# Patient Record
Sex: MALE | Race: WHITE | Employment: OTHER | URBAN - METROPOLITAN AREA
[De-identification: names, ages, dates, MRNs, and addresses within clinical notes are randomized per-mention and may not be internally consistent; named-entity substitution may affect disease eponyms.]

---

## 2017-03-07 PROCEDURE — 36415 COLL VENOUS BLD VENIPUNCTURE: CPT | Performed by: INTERNAL MEDICINE

## 2017-03-07 PROCEDURE — 80061 LIPID PANEL: CPT | Performed by: INTERNAL MEDICINE

## 2017-03-07 PROCEDURE — 80053 COMPREHEN METABOLIC PANEL: CPT | Performed by: INTERNAL MEDICINE

## 2017-03-07 PROCEDURE — 83036 HEMOGLOBIN GLYCOSYLATED A1C: CPT | Performed by: INTERNAL MEDICINE

## 2017-03-14 PROBLEM — Z79.899 ENCOUNTER FOR LONG-TERM (CURRENT) USE OF MEDICATIONS: Status: ACTIVE | Noted: 2017-03-14

## 2017-03-14 PROBLEM — Z12.5 SPECIAL SCREENING FOR MALIGNANT NEOPLASM OF PROSTATE: Status: ACTIVE | Noted: 2017-03-14

## 2017-09-12 PROCEDURE — 84153 ASSAY OF PSA TOTAL: CPT | Performed by: INTERNAL MEDICINE

## 2017-09-19 PROBLEM — Z12.5 PROSTATE CANCER SCREENING: Status: ACTIVE | Noted: 2017-09-19

## 2017-09-19 PROBLEM — Z96.652 STATUS POST TOTAL KNEE REPLACEMENT, LEFT: Status: ACTIVE | Noted: 2017-09-19

## 2017-09-19 PROBLEM — Z96.651 STATUS POST TOTAL KNEE REPLACEMENT, RIGHT: Status: ACTIVE | Noted: 2017-09-19

## 2017-09-19 PROBLEM — Z79.899 ENCOUNTER FOR LONG-TERM (CURRENT) USE OF MEDICATIONS: Status: ACTIVE | Noted: 2017-09-19

## 2017-09-19 PROBLEM — Z96.642 STATUS POST TOTAL HIP REPLACEMENT, LEFT: Status: ACTIVE | Noted: 2017-09-19

## 2017-12-05 PROBLEM — M76.51 PATELLAR TENDONITIS OF RIGHT KNEE: Status: ACTIVE | Noted: 2017-12-05

## 2018-01-14 ENCOUNTER — APPOINTMENT (OUTPATIENT)
Dept: ULTRASOUND IMAGING | Facility: HOSPITAL | Age: 78
DRG: 868 | End: 2018-01-14
Attending: EMERGENCY MEDICINE
Payer: MEDICARE

## 2018-01-14 ENCOUNTER — HOSPITAL ENCOUNTER (INPATIENT)
Facility: HOSPITAL | Age: 78
LOS: 3 days | Discharge: HOME HEALTH | DRG: 868 | End: 2018-01-18
Attending: EMERGENCY MEDICINE | Admitting: HOSPITALIST
Payer: MEDICARE

## 2018-01-14 ENCOUNTER — APPOINTMENT (OUTPATIENT)
Dept: GENERAL RADIOLOGY | Facility: HOSPITAL | Age: 78
DRG: 868 | End: 2018-01-14
Attending: EMERGENCY MEDICINE
Payer: MEDICARE

## 2018-01-14 DIAGNOSIS — R22.33: ICD-10-CM

## 2018-01-14 DIAGNOSIS — R50.9 FEVER, UNSPECIFIED FEVER CAUSE: Primary | ICD-10-CM

## 2018-01-14 DIAGNOSIS — M79.89 LOCALIZED SWELLING OF BOTH LOWER EXTREMITIES: ICD-10-CM

## 2018-01-14 LAB
ALBUMIN SERPL-MCNC: 2.8 G/DL (ref 3.5–4.8)
ALP LIVER SERPL-CCNC: 56 U/L (ref 45–117)
ALT SERPL-CCNC: 28 U/L (ref 17–63)
AST SERPL-CCNC: 27 U/L (ref 15–41)
BASOPHILS # BLD AUTO: 0.02 X10(3) UL (ref 0–0.1)
BASOPHILS NFR BLD AUTO: 0.3 %
BILIRUB SERPL-MCNC: 0.7 MG/DL (ref 0.1–2)
BILIRUB UR QL STRIP.AUTO: NEGATIVE
BUN BLD-MCNC: 17 MG/DL (ref 8–20)
C-REACTIVE PROTEIN: 13.9 MG/DL (ref ?–1)
CALCIUM BLD-MCNC: 8.8 MG/DL (ref 8.3–10.3)
CHLORIDE: 103 MMOL/L (ref 101–111)
CO2: 23 MMOL/L (ref 22–32)
COLOR UR AUTO: YELLOW
CREAT BLD-MCNC: 0.86 MG/DL (ref 0.7–1.3)
EOSINOPHIL # BLD AUTO: 0.06 X10(3) UL (ref 0–0.3)
EOSINOPHIL NFR BLD AUTO: 0.9 %
ERYTHROCYTE [DISTWIDTH] IN BLOOD BY AUTOMATED COUNT: 12.7 % (ref 11.5–16)
GLUCOSE BLD-MCNC: 111 MG/DL (ref 70–99)
GLUCOSE UR STRIP.AUTO-MCNC: NEGATIVE MG/DL
HCT VFR BLD AUTO: 34.2 % (ref 37–53)
HGB BLD-MCNC: 11.4 G/DL (ref 13–17)
IMMATURE GRANULOCYTE COUNT: 0.02 X10(3) UL (ref 0–1)
IMMATURE GRANULOCYTE RATIO %: 0.3 %
LACTIC ACID: 1 MMOL/L (ref 0.5–2)
LEUKOCYTE ESTERASE UR QL STRIP.AUTO: NEGATIVE
LYMPHOCYTES # BLD AUTO: 0.63 X10(3) UL (ref 0.9–4)
LYMPHOCYTES NFR BLD AUTO: 9.5 %
M PROTEIN MFR SERPL ELPH: 7.2 G/DL (ref 6.1–8.3)
MCH RBC QN AUTO: 29.2 PG (ref 27–33.2)
MCHC RBC AUTO-ENTMCNC: 33.3 G/DL (ref 31–37)
MCV RBC AUTO: 87.5 FL (ref 80–99)
MONOCYTES # BLD AUTO: 0.7 X10(3) UL (ref 0.1–0.6)
MONOCYTES NFR BLD AUTO: 10.5 %
NEUTROPHIL ABS PRELIM: 5.21 X10 (3) UL (ref 1.3–6.7)
NEUTROPHILS # BLD AUTO: 5.21 X10(3) UL (ref 1.3–6.7)
NEUTROPHILS NFR BLD AUTO: 78.5 %
NITRITE UR QL STRIP.AUTO: NEGATIVE
PH UR STRIP.AUTO: 5 [PH] (ref 4.5–8)
PLATELET # BLD AUTO: 298 10(3)UL (ref 150–450)
POTASSIUM SERPL-SCNC: 3.9 MMOL/L (ref 3.6–5.1)
PROT UR STRIP.AUTO-MCNC: NEGATIVE MG/DL
RBC # BLD AUTO: 3.91 X10(6)UL (ref 3.8–5.8)
RED CELL DISTRIBUTION WIDTH-SD: 40.3 FL (ref 35.1–46.3)
SED RATE-ML: 85 MM/HR (ref 0–12)
SODIUM SERPL-SCNC: 135 MMOL/L (ref 136–144)
SP GR UR STRIP.AUTO: 1.02 (ref 1–1.03)
UROBILINOGEN UR STRIP.AUTO-MCNC: <2 MG/DL
WBC # BLD AUTO: 6.6 X10(3) UL (ref 4–13)

## 2018-01-14 PROCEDURE — 87040 BLOOD CULTURE FOR BACTERIA: CPT | Performed by: EMERGENCY MEDICINE

## 2018-01-14 PROCEDURE — 71046 X-RAY EXAM CHEST 2 VIEWS: CPT | Performed by: EMERGENCY MEDICINE

## 2018-01-14 PROCEDURE — 96366 THER/PROPH/DIAG IV INF ADDON: CPT

## 2018-01-14 PROCEDURE — 80053 COMPREHEN METABOLIC PANEL: CPT | Performed by: EMERGENCY MEDICINE

## 2018-01-14 PROCEDURE — 93010 ELECTROCARDIOGRAM REPORT: CPT

## 2018-01-14 PROCEDURE — 85025 COMPLETE CBC W/AUTO DIFF WBC: CPT | Performed by: EMERGENCY MEDICINE

## 2018-01-14 PROCEDURE — 99285 EMERGENCY DEPT VISIT HI MDM: CPT

## 2018-01-14 PROCEDURE — 86140 C-REACTIVE PROTEIN: CPT | Performed by: EMERGENCY MEDICINE

## 2018-01-14 PROCEDURE — 96367 TX/PROPH/DG ADDL SEQ IV INF: CPT

## 2018-01-14 PROCEDURE — 93005 ELECTROCARDIOGRAM TRACING: CPT

## 2018-01-14 PROCEDURE — 93971 EXTREMITY STUDY: CPT | Performed by: EMERGENCY MEDICINE

## 2018-01-14 PROCEDURE — 36415 COLL VENOUS BLD VENIPUNCTURE: CPT

## 2018-01-14 PROCEDURE — 85652 RBC SED RATE AUTOMATED: CPT | Performed by: EMERGENCY MEDICINE

## 2018-01-14 PROCEDURE — 83605 ASSAY OF LACTIC ACID: CPT | Performed by: EMERGENCY MEDICINE

## 2018-01-14 PROCEDURE — 81001 URINALYSIS AUTO W/SCOPE: CPT | Performed by: EMERGENCY MEDICINE

## 2018-01-14 PROCEDURE — 96365 THER/PROPH/DIAG IV INF INIT: CPT

## 2018-01-14 RX ORDER — SODIUM CHLORIDE 9 MG/ML
INJECTION, SOLUTION INTRAVENOUS CONTINUOUS
Status: ACTIVE | OUTPATIENT
Start: 2018-01-14 | End: 2018-01-15

## 2018-01-14 RX ORDER — SODIUM CHLORIDE 9 MG/ML
125 INJECTION, SOLUTION INTRAVENOUS CONTINUOUS
Status: DISCONTINUED | OUTPATIENT
Start: 2018-01-14 | End: 2018-01-18

## 2018-01-15 ENCOUNTER — APPOINTMENT (OUTPATIENT)
Dept: CV DIAGNOSTICS | Facility: HOSPITAL | Age: 78
DRG: 868 | End: 2018-01-15
Attending: HOSPITALIST
Payer: MEDICARE

## 2018-01-15 ENCOUNTER — APPOINTMENT (OUTPATIENT)
Dept: CT IMAGING | Facility: HOSPITAL | Age: 78
DRG: 868 | End: 2018-01-15
Attending: HOSPITALIST
Payer: MEDICARE

## 2018-01-15 ENCOUNTER — APPOINTMENT (OUTPATIENT)
Dept: ULTRASOUND IMAGING | Facility: HOSPITAL | Age: 78
DRG: 868 | End: 2018-01-15
Attending: HOSPITALIST
Payer: MEDICARE

## 2018-01-15 PROBLEM — R22.33: Status: ACTIVE | Noted: 2018-01-15

## 2018-01-15 PROBLEM — R50.9 FEVER, UNSPECIFIED FEVER CAUSE: Status: ACTIVE | Noted: 2018-01-15

## 2018-01-15 PROBLEM — M79.89 LOCALIZED SWELLING OF BOTH LOWER EXTREMITIES: Status: ACTIVE | Noted: 2018-01-15

## 2018-01-15 PROBLEM — M99.86: Status: ACTIVE | Noted: 2018-01-15

## 2018-01-15 LAB
ALBUMIN SERPL-MCNC: 2.5 G/DL (ref 3.5–4.8)
ALP LIVER SERPL-CCNC: 53 U/L (ref 45–117)
ALT SERPL-CCNC: 28 U/L (ref 17–63)
AST SERPL-CCNC: 35 U/L (ref 15–41)
ATRIAL RATE: 91 BPM
BASOPHILS # BLD AUTO: 0.02 X10(3) UL (ref 0–0.1)
BASOPHILS NFR BLD AUTO: 0.4 %
BETA NATRIURETIC PEPTIDE: 52 PG/ML (ref 2–99)
BILIRUB SERPL-MCNC: 0.7 MG/DL (ref 0.1–2)
BUN BLD-MCNC: 14 MG/DL (ref 8–20)
CALCIUM BLD-MCNC: 8.5 MG/DL (ref 8.3–10.3)
CHLORIDE: 106 MMOL/L (ref 101–111)
CO2: 23 MMOL/L (ref 22–32)
CREAT BLD-MCNC: 0.75 MG/DL (ref 0.7–1.3)
EOSINOPHIL # BLD AUTO: 0.14 X10(3) UL (ref 0–0.3)
EOSINOPHIL NFR BLD AUTO: 2.8 %
ERYTHROCYTE [DISTWIDTH] IN BLOOD BY AUTOMATED COUNT: 12.5 % (ref 11.5–16)
GLUCOSE BLD-MCNC: 115 MG/DL (ref 70–99)
HAV IGM SER QL: 2.1 MG/DL (ref 1.7–3)
HCT VFR BLD AUTO: 32 % (ref 37–53)
HGB BLD-MCNC: 10.6 G/DL (ref 13–17)
IMMATURE GRANULOCYTE COUNT: 0.01 X10(3) UL (ref 0–1)
IMMATURE GRANULOCYTE RATIO %: 0.2 %
INR BLD: 1.22 (ref 0.89–1.11)
LYMPHOCYTES # BLD AUTO: 0.51 X10(3) UL (ref 0.9–4)
LYMPHOCYTES NFR BLD AUTO: 10.2 %
M PROTEIN MFR SERPL ELPH: 6.7 G/DL (ref 6.1–8.3)
MCH RBC QN AUTO: 29 PG (ref 27–33.2)
MCHC RBC AUTO-ENTMCNC: 33.1 G/DL (ref 31–37)
MCV RBC AUTO: 87.4 FL (ref 80–99)
MONOCYTES # BLD AUTO: 0.5 X10(3) UL (ref 0.1–0.6)
MONOCYTES NFR BLD AUTO: 10 %
NEUTROPHIL ABS PRELIM: 3.8 X10 (3) UL (ref 1.3–6.7)
NEUTROPHILS # BLD AUTO: 3.8 X10(3) UL (ref 1.3–6.7)
NEUTROPHILS NFR BLD AUTO: 76.4 %
P AXIS: 13 DEGREES
P-R INTERVAL: 154 MS
PLATELET # BLD AUTO: 255 10(3)UL (ref 150–450)
POTASSIUM SERPL-SCNC: 3.5 MMOL/L (ref 3.6–5.1)
POTASSIUM SERPL-SCNC: 4.3 MMOL/L (ref 3.6–5.1)
PROCALCITONIN SERPL-MCNC: <0.11 NG/ML (ref ?–0.11)
PSA SERPL DL<=0.01 NG/ML-MCNC: 15.5 SECONDS (ref 12–14.3)
Q-T INTERVAL: 382 MS
QRS DURATION: 92 MS
QTC CALCULATION (BEZET): 469 MS
R AXIS: -29 DEGREES
RBC # BLD AUTO: 3.66 X10(6)UL (ref 3.8–5.8)
RED CELL DISTRIBUTION WIDTH-SD: 40 FL (ref 35.1–46.3)
SED RATE-ML: 87 MM/HR (ref 0–12)
SODIUM SERPL-SCNC: 138 MMOL/L (ref 136–144)
T AXIS: 32 DEGREES
TSI SER-ACNC: 1.62 MIU/ML (ref 0.35–5.5)
VENTRICULAR RATE: 91 BPM
WBC # BLD AUTO: 5 X10(3) UL (ref 4–13)

## 2018-01-15 PROCEDURE — 82728 ASSAY OF FERRITIN: CPT | Performed by: HOSPITALIST

## 2018-01-15 PROCEDURE — 74177 CT ABD & PELVIS W/CONTRAST: CPT | Performed by: HOSPITALIST

## 2018-01-15 PROCEDURE — 93306 TTE W/DOPPLER COMPLETE: CPT | Performed by: HOSPITALIST

## 2018-01-15 PROCEDURE — 80053 COMPREHEN METABOLIC PANEL: CPT | Performed by: HOSPITALIST

## 2018-01-15 PROCEDURE — 85610 PROTHROMBIN TIME: CPT | Performed by: HOSPITALIST

## 2018-01-15 PROCEDURE — 85025 COMPLETE CBC W/AUTO DIFF WBC: CPT | Performed by: HOSPITALIST

## 2018-01-15 PROCEDURE — 85652 RBC SED RATE AUTOMATED: CPT | Performed by: HOSPITALIST

## 2018-01-15 PROCEDURE — 84132 ASSAY OF SERUM POTASSIUM: CPT | Performed by: HOSPITALIST

## 2018-01-15 PROCEDURE — 83880 ASSAY OF NATRIURETIC PEPTIDE: CPT | Performed by: HOSPITALIST

## 2018-01-15 PROCEDURE — 84145 PROCALCITONIN (PCT): CPT | Performed by: HOSPITALIST

## 2018-01-15 PROCEDURE — 83516 IMMUNOASSAY NONANTIBODY: CPT | Performed by: HOSPITALIST

## 2018-01-15 PROCEDURE — 83735 ASSAY OF MAGNESIUM: CPT | Performed by: HOSPITALIST

## 2018-01-15 PROCEDURE — 84443 ASSAY THYROID STIM HORMONE: CPT | Performed by: HOSPITALIST

## 2018-01-15 PROCEDURE — 83550 IRON BINDING TEST: CPT | Performed by: HOSPITALIST

## 2018-01-15 PROCEDURE — 86038 ANTINUCLEAR ANTIBODIES: CPT | Performed by: HOSPITALIST

## 2018-01-15 PROCEDURE — 83540 ASSAY OF IRON: CPT | Performed by: HOSPITALIST

## 2018-01-15 PROCEDURE — 86255 FLUORESCENT ANTIBODY SCREEN: CPT | Performed by: HOSPITALIST

## 2018-01-15 PROCEDURE — 93971 EXTREMITY STUDY: CPT | Performed by: HOSPITALIST

## 2018-01-15 PROCEDURE — 83876 ASSAY MYELOPEROXIDASE: CPT | Performed by: HOSPITALIST

## 2018-01-15 PROCEDURE — 71260 CT THORAX DX C+: CPT | Performed by: HOSPITALIST

## 2018-01-15 RX ORDER — ONDANSETRON 2 MG/ML
4 INJECTION INTRAMUSCULAR; INTRAVENOUS EVERY 6 HOURS PRN
Status: DISCONTINUED | OUTPATIENT
Start: 2018-01-15 | End: 2018-01-18

## 2018-01-15 RX ORDER — HEPARIN SODIUM 5000 [USP'U]/ML
7500 INJECTION, SOLUTION INTRAVENOUS; SUBCUTANEOUS EVERY 8 HOURS SCHEDULED
Status: DISCONTINUED | OUTPATIENT
Start: 2018-01-15 | End: 2018-01-18

## 2018-01-15 RX ORDER — PANTOPRAZOLE SODIUM 20 MG/1
20 TABLET, DELAYED RELEASE ORAL
Status: DISCONTINUED | OUTPATIENT
Start: 2018-01-15 | End: 2018-01-18

## 2018-01-15 RX ORDER — ACETAMINOPHEN 325 MG/1
650 TABLET ORAL EVERY 6 HOURS PRN
Status: DISCONTINUED | OUTPATIENT
Start: 2018-01-15 | End: 2018-01-18

## 2018-01-15 RX ORDER — SODIUM CHLORIDE 9 MG/ML
INJECTION, SOLUTION INTRAVENOUS CONTINUOUS
Status: DISCONTINUED | OUTPATIENT
Start: 2018-01-15 | End: 2018-01-18

## 2018-01-15 RX ORDER — POTASSIUM CHLORIDE 20 MEQ/1
40 TABLET, EXTENDED RELEASE ORAL EVERY 4 HOURS
Status: DISPENSED | OUTPATIENT
Start: 2018-01-15 | End: 2018-01-15

## 2018-01-15 NOTE — PROGRESS NOTES
[facility] Pharmacy Progress Note:  Anticoagulation Weight Dose Adjustment for heparin    Valentin Beaulieu is a 65year old male who has been prescribed heparin for VTE prophylaxis. Estimated Creatinine Clearance: 59.3 mL/min (based on SCr of 0.86 mg/dL).

## 2018-01-15 NOTE — ED INITIAL ASSESSMENT (HPI)
Complaint of BLE weakness, fevers, and \"blisters\" to the hands that he noticed today. Denies any recent falls or trauma. Denies cough. Patient also said he has had diarrhea x 3 since yesterday.

## 2018-01-15 NOTE — PROGRESS NOTES
[facility] Pharmacy Note: Antimicrobial Weight Dose Adjustment for: vancomycin Marcela Muhammad    Eriberto Marsh is a 65year old male who has been prescribed vancomycin (VANCOCIN) 1000 mg IV x 1 in ED.   CrCl is CrCl cannot be calculated (Patient's most recent lab re

## 2018-01-15 NOTE — ED PROVIDER NOTES
Patient Seen in: [facility] Emergency Department    History   Patient presents with:  Numbness Weakness (neurologic)  Fever (infectious)    Stated Complaint: ble weakness, fever, blisters    HPI    Patient presents to the emergency department with p pleasant obese elderly man lying on an emergency department bed with his daughter at bedside. He is awake alert and oriented ×4 he is in no acute distress nontoxic his HEENT exam is within normal limits his neck is supple.   His heart has very distant opal components within normal limits   LACTIC ACID, PLASMA - Normal   CBC WITH DIFFERENTIAL WITH PLATELET    Narrative: The following orders were created for panel order CBC WITH DIFFERENTIAL WITH PLATELET.   Procedure                               Abnormali feet I do think he has to be admitted.   I discussed the case with Dr. Chacha Aguilar and we will get an echocardiogram.  We will consult infectious disease as per his request and patient has been started on broad-spectrum antibiotics given the fever however his whit

## 2018-01-15 NOTE — CONSULTS
INFECTIOUS DISEASE CONSULTATION    Angela Santiago Patient Status:  Observation    1940 MRN [id number]   [facility] 4NW-A Attending [facility] Sink   Hosp Day # 0 PCP Otf Vidal Exam:    General: No acute distress. Alert and oriented x 3. Vital signs: Temp:  [97.8 °F (36.6 °C)-99.6 °F (37.6 °C)] 99.6 °F (37.6 °C)  Pulse:  [78-99] 82  Resp:  [15-22] 18  BP: (112-150)/(46-79) 127/60  HEENT: Moist mucous membranes.  Extraocular muscl Daysi     MEDICARE WELLNESS VISIT - 9/19/17*     Actinic keratosis (9/11) [facility] D[facility]     Varicose veins of both lower extremities with pain     Diabetic eye exam - ADAN Santillan [facility])     Trigger finger of right thumb - MADY Green

## 2018-01-15 NOTE — PROGRESS NOTES
[facility] Pharmacy Note: Antimicrobial Weight Dose Adjustment for: piperacillin/tazobactam (Jus Macadamia)    Florida Godwin is a 65year old male who has been prescribed piperacillin/tazobactam (ZOSYN) 3.375 gm IV x 1 in ED.   CrCl is CrCl cannot be calculated (Patient

## 2018-01-16 LAB
ANA SCREEN: NEGATIVE
BASOPHILS # BLD AUTO: 0.01 X10(3) UL (ref 0–0.1)
BASOPHILS NFR BLD AUTO: 0.2 %
DEPRECATED HBV CORE AB SER IA-ACNC: 533.6 NG/ML (ref 22–322)
EOSINOPHIL # BLD AUTO: 0.14 X10(3) UL (ref 0–0.3)
EOSINOPHIL NFR BLD AUTO: 3.4 %
ERYTHROCYTE [DISTWIDTH] IN BLOOD BY AUTOMATED COUNT: 12.5 % (ref 11.5–16)
HCT VFR BLD AUTO: 30.3 % (ref 37–53)
HGB BLD-MCNC: 10 G/DL (ref 13–17)
IMMATURE GRANULOCYTE COUNT: 0.01 X10(3) UL (ref 0–1)
IMMATURE GRANULOCYTE RATIO %: 0.2 %
IRON SATURATION: 15 % (ref 13–45)
IRON: 34 UG/DL (ref 45–182)
LYMPHOCYTES # BLD AUTO: 0.58 X10(3) UL (ref 0.9–4)
LYMPHOCYTES NFR BLD AUTO: 14.1 %
MCH RBC QN AUTO: 29.4 PG (ref 27–33.2)
MCHC RBC AUTO-ENTMCNC: 33 G/DL (ref 31–37)
MCV RBC AUTO: 89.1 FL (ref 80–99)
MONOCYTES # BLD AUTO: 0.47 X10(3) UL (ref 0.1–0.6)
MONOCYTES NFR BLD AUTO: 11.4 %
NEUTROPHIL ABS PRELIM: 2.9 X10 (3) UL (ref 1.3–6.7)
NEUTROPHILS # BLD AUTO: 2.9 X10(3) UL (ref 1.3–6.7)
NEUTROPHILS NFR BLD AUTO: 70.7 %
PLATELET # BLD AUTO: 232 10(3)UL (ref 150–450)
RBC # BLD AUTO: 3.4 X10(6)UL (ref 3.8–5.8)
RED CELL DISTRIBUTION WIDTH-SD: 40.6 FL (ref 35.1–46.3)
TOTAL IRON BINDING CAPACITY: 225 UG/DL (ref 298–536)
TRANSFERRIN: 151 MG/DL (ref 200–360)
WBC # BLD AUTO: 4.1 X10(3) UL (ref 4–13)

## 2018-01-16 PROCEDURE — 85025 COMPLETE CBC W/AUTO DIFF WBC: CPT | Performed by: HOSPITALIST

## 2018-01-16 RX ORDER — SODIUM CHLORIDE 9 MG/ML
INJECTION, SOLUTION INTRAVENOUS CONTINUOUS
Status: CANCELLED | OUTPATIENT
Start: 2018-01-16

## 2018-01-16 RX ORDER — SODIUM CHLORIDE 9 MG/ML
INJECTION, SOLUTION INTRAVENOUS CONTINUOUS
Status: DISCONTINUED | OUTPATIENT
Start: 2018-01-16 | End: 2018-01-18

## 2018-01-16 NOTE — CONSULTS
[facility] [address] [place] [address] Cardiology  Report of Consultation    Sheela Sanderson Patient Status:  Inpatient    1940 MRN [id number]   [facility] 4NW-A Attending Precilla Kiran,*   Hosp Day # 1 PCP Jim Harry MD     Reason for Con sodium chloride 42 mL/hr at 01/15/18 0053   • sodium chloride 125 mL/hr (01/14/18 1948)       PRN Medications:   acetaminophen, ondansetron HCl    Outpatient Medications:     No current facility-administered medications on file prior to encounter.    Sheryle Margo No murmurs, rubs, or gallops. PMI is non-displaced with a normal apical impulse. Lungs: Clear to ascultation bilaterally. No focal rales, rhonchi, or wheezes. Good air movement is noted throughout all lung fields. Abdomen: Soft. Morbidly obese.   Non- quality     was poor. The study was technically limited due to poor acoustic window     availability and body habitus. Intravenous contrast (Definity) was     administered to opacify the LV. 2. Left ventricle:  The cavity size was normal. Wall thickness wa

## 2018-01-16 NOTE — PLAN OF CARE
Impaired Functional Mobility    • Achieve highest/safest level of mobility/gait Progressing        Patient/Family Goals    • Patient/Family Long Term Goal Progressing    • Patient/Family Short Term Goal Progressing        SKIN/TISSUE INTEGRITY - ADULT    •

## 2018-01-16 NOTE — PROGRESS NOTES
[facility]                INFECTIOUS DISEASE PROGRESS NOTE    Wendy Hernandez Patient Status:  Inpatient    1940 MRN [id number]   [facility] 4NW-A Attending Luis Miguel Diaz,*   Hosp Day # 1 PCP Casey Carney MD     A Collected: 01/14/18 1944   Order Status: Completed Lab Status: Preliminary result Updated: 01/15/18 2000   Specimen: Blood from Blood,peripheral     Blood Culture Result No Growth 1 Day         Radiology    CT chest    =====  CONCLUSION:  Mild lymphadenopa lesions  Diarrhea prior to admission resolved    Patient reports pet dog, licks his fingers and hands frequently, no bites   No other pets, does not have fish tank    Continue empiric abx/await follow up response and cultures      Jad Frias MD  Me

## 2018-01-16 NOTE — PROGRESS NOTES
[facility] Hospitalist Progress Note                                                                   Everett Murphy  9/25/1940    CC: Fever, rash    Interval History:  - Has some new lesions on 12.5   NEPRELIM  5.21  3.80  2.90   WBC  6.6  5.0  4.1   PLT  298.0  255.0  232.0     Recent Labs   Lab  01/14/18   1929  01/15/18   0718  01/15/18   1921   GLU  111*  115*   --    BUN  17  14   --    CREATSERUM  0.86  0.75   --    CA  8.8  8.5   --    NA repeat UA, if persistently positive needs  eval     LE edema  - Mild, BNP OK, ECHO OK, dopplers neg for clot    FEN:  - General diet  - Lytes prn     Proph:  - DVT proph with      Dispo:  - [facility] hospitalist service  - Consults include: ID, Cards     Outpa

## 2018-01-16 NOTE — CM/SW NOTE
01/16/18 1100   CM/SW Referral Data   Referral Source Social Work (self-referral)   Reason for Referral Discharge planning   Informant Patient   Patient Info   Patient's Mental Status Alert;Oriented   Patient's [address]   Number of Levels

## 2018-01-16 NOTE — PROGRESS NOTES
Patient is awake and alertx4. Room air. IV antibiotics for suspected cellulitis/redness of legs/hands. Patient needs physical therapy eval because he would like it at home. All questions answered and needs addressed.

## 2018-01-16 NOTE — PLAN OF CARE
Patient tolerated ct of abdomen, and was seen by infectious disease, and new anitbitics ordered. No changes to left index finger noted, denies need for pain med. Ambulated up to bathroom with assistance of 1 and patient very slow and apprehnesive upon ambula

## 2018-01-17 ENCOUNTER — APPOINTMENT (OUTPATIENT)
Dept: CV DIAGNOSTICS | Facility: HOSPITAL | Age: 78
DRG: 868 | End: 2018-01-17
Attending: NURSE PRACTITIONER
Payer: MEDICARE

## 2018-01-17 ENCOUNTER — ANESTHESIA (OUTPATIENT)
Dept: PERIOP | Facility: HOSPITAL | Age: 78
DRG: 868 | End: 2018-01-17
Payer: MEDICARE

## 2018-01-17 ENCOUNTER — ANESTHESIA EVENT (OUTPATIENT)
Dept: PERIOP | Facility: HOSPITAL | Age: 78
DRG: 868 | End: 2018-01-17
Payer: MEDICARE

## 2018-01-17 LAB
MYELOPEROX ANTIBODIES, IGG: 3 AU/ML
SERINE PROTEASE3, IGG: 0 AU/ML

## 2018-01-17 PROCEDURE — B246ZZ4 ULTRASONOGRAPHY OF RIGHT AND LEFT HEART, TRANSESOPHAGEAL: ICD-10-PCS | Performed by: INTERNAL MEDICINE

## 2018-01-17 PROCEDURE — 93320 DOPPLER ECHO COMPLETE: CPT | Performed by: NURSE PRACTITIONER

## 2018-01-17 PROCEDURE — 93325 DOPPLER ECHO COLOR FLOW MAPG: CPT | Performed by: NURSE PRACTITIONER

## 2018-01-17 PROCEDURE — 97116 GAIT TRAINING THERAPY: CPT

## 2018-01-17 PROCEDURE — 93312 ECHO TRANSESOPHAGEAL: CPT

## 2018-01-17 PROCEDURE — 97162 PT EVAL MOD COMPLEX 30 MIN: CPT

## 2018-01-17 RX ORDER — FUROSEMIDE 10 MG/ML
20 INJECTION INTRAMUSCULAR; INTRAVENOUS ONCE
Status: COMPLETED | OUTPATIENT
Start: 2018-01-17 | End: 2018-01-17

## 2018-01-17 RX ORDER — LEVOFLOXACIN 750 MG/1
750 TABLET ORAL DAILY
Qty: 14 TABLET | Refills: 0 | Status: SHIPPED | OUTPATIENT
Start: 2018-01-17 | End: 2018-01-18

## 2018-01-17 NOTE — ANESTHESIA PREPROCEDURE EVALUATION
PRE-OP EVALUATION    Patient Name: Mary Hannon    Pre-op Diagnosis: * No pre-op diagnosis entered *    * No procedures listed *    * No surgeons found in log *    Pre-op vitals reviewed.   Temp: 98.6 °F (37 °C)  Pulse: 68  Resp: 21  BP: 92/69  SpO2: 98 omeprazole (PRILOSEC) 20 MG Oral Capsule Delayed Release Take 1 capsule by mouth once daily.  Disp: 90 capsule Rfl: 3   triamcinolone acetonide (KENALOG) 0.1 % Apply Externally Cream Apply to AA of the back twice a day x two weeks Disp: 60 g Rfl: 2   Mult Airway      Mallampati: II  Mouth opening: 3 FB  TM distance: 4 - 6 cm  Neck ROM: limited Cardiovascular    Cardiovascular exam normal.         Dental    No notable dental history.          Pulmonary    Pulmonary exam normal.                 Other f

## 2018-01-17 NOTE — PHYSICAL THERAPY NOTE
PHYSICAL THERAPY EVALUATION - INPATIENT     Room Number: 169/501-R  Evaluation Date: 1/17/2018  Type of Evaluation: Initial  Physician Order: PT Eval and Treat    Presenting Problem: Fever, rash  Reason for Therapy: Mobility Dysfunction and Discharge P knee  Management Techniques: Activity promotion; Body mechanics;Repositioning    COGNITION  · Overall Cognitive Status:  WFL - within functional limits  · Safety Judgement:  good awareness of safety precautions    RANGE OF MOTION AND STRENGTH ASSESSMENT  Up awareness of lines. Pt ambulated to bathroom using B cane, at supervision. [facility] staff arrived to transport pt limiting session. Discussed possibility of using walker with pt.  Educated that it would provide additional support for ambulation, decreased pain wi education;Gait training;Transfer training  Rehab Potential : Good  Frequency (Obs): 5x/week  Number of Visits to Meet Established Goals: 2    Plan to trial RW at next session.     CURRENT GOALS    Goal #1 Patient is able to demonstrate supine - sit EOB @ le

## 2018-01-17 NOTE — PROGRESS NOTES
[facility]                INFECTIOUS DISEASE PROGRESS NOTE    Tiffany Recinos Patient Status:  Inpatient    1940 MRN [id number]   [facility] 4NW-A Attending Royce Velázquez,*   Hosp Day # 2 PCP Wendy Turcios MD     A Atorvastatin / LDL Goal < 100 mg/dl     BMI, [Adult] 40.0-44.9 kg/sq m = Morbid Obesity, [BMI 30 ~ 165 lbs] (HCC)     H/O [1/11] diverticulosis with hemorrhage [1/11] recheck 10 years - ARASELI Llamas 54 VISIT - 9/19/17*     Actinic kerat

## 2018-01-17 NOTE — PROGRESS NOTES
[facility] Hospitalist Progress Note                                                                   Everett Murphy  9/25/1940    CC: Fever, rash    Interval History:  - Looks better today  - Ra MCHC  33.3  33.1  33.0   RDW  12.7  12.5  12.5   NEPRELIM  5.21  3.80  2.90   WBC  6.6  5.0  4.1   PLT  298.0  255.0  232.0     Recent Labs   Lab  01/14/18   1929  01/15/18   0718  01/15/18   1921   GLU  111*  115*   --    BUN  17  14   --    CREATSERUM per Cards    FEN:  - General diet  - Lytes prn     Proph:  - DVT proph with      Dispo:  - [facility] hospitalist service  - Consults include: ID, Cards     Potentially home on PO Abx tomorrow pending ID eval today.  Will need outpatient FU with Heme/Onc given lym

## 2018-01-17 NOTE — PROGRESS NOTES
Post KRYSTLE,Vitals are stable, report given to Jakub Coppola (Katelyn) patient transferred to his room in stable condition.

## 2018-01-17 NOTE — CM/SW NOTE
Followed up w/pt regarding HHPT. Pt stating he wants \"outpatient services\" and did not seem interested in HHPT. SW to inform RN and PT of this.

## 2018-01-17 NOTE — PROCEDURES
Procedure:  KRYSTLE    Indication:  Fever / potential cardioembolic source of hand lesions      Sedation:   Per anesthesia    Findings:  1. NL LV size and function  2. Mild MR  3. Mild fibrocalcific thickening of trileaflet AV  4.   No focal vegetative proce

## 2018-01-17 NOTE — PROGRESS NOTES
[facility] Cardiology  Progress Note    Mitalikayode Anderson Patient Status:  Inpatient    1940 MRN [id number]   [facility] 4NW-A Attending Ana Ortiz,*   Hosp Day # 2 PCP Lakshmi Chavarria MD     Subjective:   No chest There is normal S1, S2. There is no S3 or S4. There are no murmurs, rubs, or gallops. No click is appreciated. PMI is nondisplaced with a normal apical impulse. Pulmonary:  Lungs are clear to auscultation bilaterally.   There are no focal rales, rhon (Definity) was     administered to opacify the LV. 2. Left ventricle: The cavity size was normal. Wall thickness was mildly     increased. Left ventricular diastolic function parameters were normal for     the patient&apos;s age.   3. Aortic valve: Mildly

## 2018-01-17 NOTE — PROGRESS NOTES
Patient awake and alert. NPO starting at midnight. Patient went down for KRYSTLE procedure. Patient has a RAC and Lwrist IV with NS at 43/hr. All needs addressed and questions answered.

## 2018-01-18 VITALS
SYSTOLIC BLOOD PRESSURE: 135 MMHG | BODY MASS INDEX: 49.51 KG/M2 | HEIGHT: 64 IN | TEMPERATURE: 99 F | DIASTOLIC BLOOD PRESSURE: 68 MMHG | HEART RATE: 75 BPM | OXYGEN SATURATION: 96 % | WEIGHT: 290 LBS | RESPIRATION RATE: 18 BRPM

## 2018-01-18 LAB
BASOPHILS # BLD AUTO: 0.01 X10(3) UL (ref 0–0.1)
BASOPHILS NFR BLD AUTO: 0.3 %
EOSINOPHIL # BLD AUTO: 0.16 X10(3) UL (ref 0–0.3)
EOSINOPHIL NFR BLD AUTO: 4.3 %
ERYTHROCYTE [DISTWIDTH] IN BLOOD BY AUTOMATED COUNT: 12.4 % (ref 11.5–16)
HCT VFR BLD AUTO: 32.3 % (ref 37–53)
HGB BLD-MCNC: 10.5 G/DL (ref 13–17)
IMMATURE GRANULOCYTE COUNT: 0.02 X10(3) UL (ref 0–1)
IMMATURE GRANULOCYTE RATIO %: 0.5 %
LYMPHOCYTES # BLD AUTO: 0.59 X10(3) UL (ref 0.9–4)
LYMPHOCYTES NFR BLD AUTO: 15.9 %
MCH RBC QN AUTO: 28.9 PG (ref 27–33.2)
MCHC RBC AUTO-ENTMCNC: 32.5 G/DL (ref 31–37)
MCV RBC AUTO: 89 FL (ref 80–99)
MONOCYTES # BLD AUTO: 0.32 X10(3) UL (ref 0.1–0.6)
MONOCYTES NFR BLD AUTO: 8.6 %
NEUTROPHIL ABS PRELIM: 2.6 X10 (3) UL (ref 1.3–6.7)
NEUTROPHILS # BLD AUTO: 2.6 X10(3) UL (ref 1.3–6.7)
NEUTROPHILS NFR BLD AUTO: 70.4 %
PLATELET # BLD AUTO: 268 10(3)UL (ref 150–450)
RBC # BLD AUTO: 3.63 X10(6)UL (ref 3.8–5.8)
RED CELL DISTRIBUTION WIDTH-SD: 40.2 FL (ref 35.1–46.3)
WBC # BLD AUTO: 3.7 X10(3) UL (ref 4–13)

## 2018-01-18 PROCEDURE — 97530 THERAPEUTIC ACTIVITIES: CPT

## 2018-01-18 PROCEDURE — 85025 COMPLETE CBC W/AUTO DIFF WBC: CPT | Performed by: HOSPITALIST

## 2018-01-18 PROCEDURE — 97116 GAIT TRAINING THERAPY: CPT

## 2018-01-18 RX ORDER — LEVOFLOXACIN 750 MG/1
750 TABLET ORAL DAILY
Qty: 14 TABLET | Refills: 0 | Status: SHIPPED | OUTPATIENT
Start: 2018-01-18 | End: 2018-02-04 | Stop reason: ALTCHOICE

## 2018-01-18 NOTE — PHYSICAL THERAPY NOTE
PHYSICAL THERAPY TREATMENT NOTE - INPATIENT    Room Number: 950/188-N     Session: 1  Number of Visits to Meet Established Goals: 2    Presenting Problem: Fever, rash    History related to current admission: Pt was admitted from home on 1/14/2018 with osman patient currently have. ..  -   Turning over in bed (including adjusting bedclothes, sheets and blankets)?: A Little   -   Sitting down on and standing up from a chair with arms (e.g., wheelchair, bedside commode, etc.): None   -   Moving from lying on back times instead of bilateral canes.    Patient currently does not meet criteria for skilled inpatient physical therapy services, however patient will remain on Inpatient Mobility Team and will continue with ambulation and therapeutic exercise to maintain curr

## 2018-01-18 NOTE — PROGRESS NOTES
NURSING DISCHARGE NOTE    Discharged Home via Wheelchair. Accompanied by Friend  Belongings Taken by patient/family. Patient for discharge to home today. Discharge instructions reviewed with patient at bedside.  Prescription for antibiotic electro

## 2018-01-18 NOTE — DISCHARGE SUMMARY
General Medicine Discharge Summary     Patient ID:  Jareth Molina  65year old  9/25/1940    Admit date: 1/14/2018    Discharge date and time:  1/18/18    Attending Physician: No att. providers found to ensure full resolution     Fever  - Afebrile on abx in house  - Culture thus far NGTD as above  - UA clear, CXR with question of mass in RUL- FU CT chest w/o mass but + LAD, see below  - ID following- based on extensive workup suspect 2/2 cellulitis rel Normal, Disp-60 tablet, R-0    METFORMIN  MG Oral Tab  TAKE 1 TABLET (500 MG TOTAL) BY MOUTH DAILY., Normal, Disp-90 tablet, R-4    ATORVASTATIN 20 MG Oral Tab  TAKE ONE TABLET BY MOUTH EACH NIGHT, Normal, Disp-90 tablet, R-3    omeprazole (PRILOSEC Minutes    MD Johana Grove MD  [facility] Hospitalist  Pager: [phone]

## 2018-01-18 NOTE — PLAN OF CARE
Impaired Functional Mobility    • Achieve highest/safest level of mobility/gait Progressing        SKIN/TISSUE INTEGRITY - ADULT    • Skin integrity remains intact Progressing        A/ O x4. Slept in the recliner. Vss. No fever noted.  Denies pain or disco

## 2018-01-18 NOTE — PLAN OF CARE
Patient alert and oriented x4. Patient denies pain this morning. Patient with improving rash; Only redness to second digit on left hand. Patient able to d/c home on PO antibiotics per Dr. Jericho Love. Patient hoping to discharge home later today.  Patient update

## 2018-01-18 NOTE — CM/SW NOTE
PT stating pt is now agreeable to HHPT. SW followed up w/pt about this. Pt agreeable. Pt lives in [place], [place] and has limited options due to location. Pt agreeable to [facility]. SW sent referral via [address].  SW contacted Sendy Salas from [facility] [phone]

## 2018-01-18 NOTE — ANESTHESIA POSTPROCEDURE EVALUATION
Everett Murphy Patient Status:  Inpatient   Age/Gender 65year old male MRN [id number]   [facility] 4NW-A Attending [place], [facility] [address] Day # 3 PCP Devan Huber MD       Anesthesia Post-op Note    * No proced

## 2018-01-18 NOTE — PROGRESS NOTES
[facility]                INFECTIOUS DISEASE PROGRESS NOTE    Wendy Hernandez Patient Status:  Inpatient    1940 MRN [id number]   [facility] 4NW-A Attending Luis Miguel Diaz,*   Hosp Day # 3 PCP Casey Carney MD     A right thumb - MADY Amezcua     Primary osteoarthritis of right hip     S/P [ ' 03 ] Lt total hip replacement - Casie Jauregui     S/P [12/07] Rt total knee replacement - CYDNEY Flores     S/P [6/12] Lt total knee replacement     Prostate cancer screening     Encounter f

## 2018-01-29 PROBLEM — R50.9 FEVER: Status: RESOLVED | Noted: 2018-01-14 | Resolved: 2018-01-29

## 2018-02-04 PROBLEM — Z79.899 ENCOUNTER FOR LONG-TERM (CURRENT) USE OF MEDICATIONS: Status: ACTIVE | Noted: 2018-02-04

## 2018-02-04 PROBLEM — M79.89 LOCALIZED SWELLING OF BOTH LOWER EXTREMITIES: Status: RESOLVED | Noted: 2018-01-15 | Resolved: 2018-02-04

## 2018-02-04 PROBLEM — R22.33: Status: RESOLVED | Noted: 2018-01-15 | Resolved: 2018-02-04

## 2018-02-04 PROBLEM — Z12.5 PROSTATE CANCER SCREENING: Status: ACTIVE | Noted: 2018-02-04

## 2018-03-24 PROBLEM — R50.9 FEVER, UNSPECIFIED FEVER CAUSE: Status: RESOLVED | Noted: 2018-01-15 | Resolved: 2018-03-24

## 2018-05-01 PROBLEM — M19.041 OSTEOARTHRITIS OF FINGER, RIGHT: Status: ACTIVE | Noted: 2018-05-01

## 2018-05-01 PROBLEM — R22.31 MASS OF FINGER OF RIGHT HAND: Status: ACTIVE | Noted: 2018-05-01

## 2018-05-04 PROCEDURE — 88304 TISSUE EXAM BY PATHOLOGIST: CPT | Performed by: ORTHOPAEDIC SURGERY

## 2018-09-13 PROCEDURE — 84153 ASSAY OF PSA TOTAL: CPT | Performed by: INTERNAL MEDICINE

## 2018-10-11 PROBLEM — R59.0 THORACIC LYMPHADENOPATHY: Status: ACTIVE | Noted: 2018-10-11

## 2019-03-13 PROCEDURE — 81003 URINALYSIS AUTO W/O SCOPE: CPT | Performed by: INTERNAL MEDICINE

## 2019-09-26 PROBLEM — R22.31 MASS OF FINGER OF RIGHT HAND: Status: RESOLVED | Noted: 2018-05-01 | Resolved: 2019-09-26

## 2019-09-26 PROBLEM — Z96.652 STATUS POST TOTAL KNEE REPLACEMENT, LEFT: Status: RESOLVED | Noted: 2017-09-19 | Resolved: 2019-09-26

## 2019-09-26 PROBLEM — M76.51 PATELLAR TENDONITIS OF RIGHT KNEE: Status: RESOLVED | Noted: 2017-12-05 | Resolved: 2019-09-26

## 2019-09-26 PROBLEM — Z96.642 STATUS POST TOTAL HIP REPLACEMENT, LEFT: Status: RESOLVED | Noted: 2017-09-19 | Resolved: 2019-09-26

## 2019-09-26 PROBLEM — M99.86: Status: RESOLVED | Noted: 2018-01-15 | Resolved: 2019-09-26

## 2019-09-26 PROBLEM — Z96.651 STATUS POST TOTAL KNEE REPLACEMENT, RIGHT: Status: RESOLVED | Noted: 2017-09-19 | Resolved: 2019-09-26

## 2021-06-14 PROBLEM — E66.01 OBESITY, MORBID (HCC): Status: ACTIVE | Noted: 2021-06-14

## 2022-03-02 PROBLEM — R59.0 THORACIC LYMPHADENOPATHY: Status: RESOLVED | Noted: 2018-10-11 | Resolved: 2022-03-02

## 2023-07-31 ENCOUNTER — HOSPITAL ENCOUNTER (EMERGENCY)
Facility: HOSPITAL | Age: 83
Discharge: HOME | End: 2023-07-31
Attending: EMERGENCY MEDICINE
Payer: MEDICARE

## 2023-07-31 ENCOUNTER — APPOINTMENT (OUTPATIENT)
Dept: GENERAL RADIOLOGY | Facility: HOSPITAL | Age: 83
End: 2023-07-31
Payer: MEDICARE

## 2023-07-31 VITALS
HEIGHT: 64 IN | SYSTOLIC BLOOD PRESSURE: 142 MMHG | BODY MASS INDEX: 42.68 KG/M2 | DIASTOLIC BLOOD PRESSURE: 83 MMHG | WEIGHT: 250 LBS | TEMPERATURE: 98 F | OXYGEN SATURATION: 95 % | RESPIRATION RATE: 16 BRPM | HEART RATE: 76 BPM

## 2023-07-31 DIAGNOSIS — S63.634A SPRAIN OF INTERPHALANGEAL JOINT OF RIGHT RING FINGER, INITIAL ENCOUNTER: Primary | ICD-10-CM

## 2023-07-31 PROCEDURE — 73140 X-RAY EXAM OF FINGER(S): CPT

## 2023-07-31 PROCEDURE — 99283 EMERGENCY DEPT VISIT LOW MDM: CPT

## 2023-07-31 PROCEDURE — 99284 EMERGENCY DEPT VISIT MOD MDM: CPT

## 2023-07-31 RX ORDER — TAMSULOSIN HYDROCHLORIDE 0.4 MG/1
0.4 CAPSULE ORAL DAILY
COMMUNITY
Start: 2023-06-05 | End: 2023-08-14

## 2023-07-31 RX ORDER — MELOXICAM 7.5 MG/1
7.5 TABLET ORAL DAILY
COMMUNITY
Start: 2023-07-11 | End: 2023-08-14

## 2023-07-31 NOTE — DISCHARGE INSTRUCTIONS
You likely have a tendon or ligament injury causing your finger to deviate slightly at the tip. Call for follow-up with hand surgery for further recommendations.

## 2023-07-31 NOTE — ED INITIAL ASSESSMENT (HPI)
PT here due to injury to his right 4th digit. Pt was getting a case of water down and it twisted and caught the tip of his 4th digit. The digit is black and blue. PT denies any other injury.

## 2023-08-04 ENCOUNTER — ANESTHESIA (OUTPATIENT)
Dept: ENDOSCOPY | Facility: HOSPITAL | Age: 83
End: 2023-08-04
Payer: MEDICARE

## 2023-08-04 ENCOUNTER — APPOINTMENT (OUTPATIENT)
Dept: NUCLEAR MEDICINE | Facility: HOSPITAL | Age: 83
End: 2023-08-04
Attending: EMERGENCY MEDICINE
Payer: MEDICARE

## 2023-08-04 ENCOUNTER — ANESTHESIA EVENT (OUTPATIENT)
Dept: ENDOSCOPY | Facility: HOSPITAL | Age: 83
End: 2023-08-04
Payer: MEDICARE

## 2023-08-04 ENCOUNTER — HOSPITAL ENCOUNTER (INPATIENT)
Facility: HOSPITAL | Age: 83
LOS: 10 days | Discharge: HOME | End: 2023-08-14
Attending: EMERGENCY MEDICINE | Admitting: INTERNAL MEDICINE
Payer: MEDICARE

## 2023-08-04 DIAGNOSIS — K92.2 GASTROINTESTINAL HEMORRHAGE, UNSPECIFIED GASTROINTESTINAL HEMORRHAGE TYPE: Primary | ICD-10-CM

## 2023-08-04 LAB
ALBUMIN SERPL-MCNC: 3.3 G/DL (ref 3.4–5)
ALBUMIN/GLOB SERPL: 1.1 {RATIO} (ref 1–2)
ALP LIVER SERPL-CCNC: 61 U/L
ALT SERPL-CCNC: 17 U/L
ANION GAP SERPL CALC-SCNC: 2 MMOL/L (ref 0–18)
ANTIBODY SCREEN: NEGATIVE
APTT PPP: 32 SECONDS (ref 23.3–35.6)
AST SERPL-CCNC: 18 U/L (ref 15–37)
BASOPHILS # BLD AUTO: 0.01 X10(3) UL (ref 0–0.2)
BASOPHILS # BLD AUTO: 0.02 X10(3) UL (ref 0–0.2)
BASOPHILS NFR BLD AUTO: 0.2 %
BASOPHILS NFR BLD AUTO: 0.3 %
BILIRUB SERPL-MCNC: 0.4 MG/DL (ref 0.1–2)
BUN BLD-MCNC: 31 MG/DL (ref 7–18)
CALCIUM BLD-MCNC: 9.1 MG/DL (ref 8.5–10.1)
CHLORIDE SERPL-SCNC: 111 MMOL/L (ref 98–112)
CO2 SERPL-SCNC: 26 MMOL/L (ref 21–32)
CREAT BLD-MCNC: 0.79 MG/DL
EGFRCR SERPLBLD CKD-EPI 2021: 88 ML/MIN/1.73M2 (ref 60–?)
EOSINOPHIL # BLD AUTO: 0.08 X10(3) UL (ref 0–0.7)
EOSINOPHIL # BLD AUTO: 0.1 X10(3) UL (ref 0–0.7)
EOSINOPHIL NFR BLD AUTO: 1.2 %
EOSINOPHIL NFR BLD AUTO: 1.5 %
ERYTHROCYTE [DISTWIDTH] IN BLOOD BY AUTOMATED COUNT: 13.1 %
ERYTHROCYTE [DISTWIDTH] IN BLOOD BY AUTOMATED COUNT: 13.2 %
EST. AVERAGE GLUCOSE BLD GHB EST-MCNC: 120 MG/DL (ref 68–126)
GLOBULIN PLAS-MCNC: 3.1 G/DL (ref 2.8–4.4)
GLUCOSE BLD-MCNC: 131 MG/DL (ref 70–99)
HBA1C MFR BLD: 5.8 % (ref ?–5.7)
HCT VFR BLD AUTO: 30.4 %
HCT VFR BLD AUTO: 36.1 %
HGB BLD-MCNC: 11.7 G/DL
HGB BLD-MCNC: 9.7 G/DL
HGB BLD-MCNC: 9.8 G/DL
HGB BLD-MCNC: 9.8 G/DL
IMM GRANULOCYTES # BLD AUTO: 0.01 X10(3) UL (ref 0–1)
IMM GRANULOCYTES # BLD AUTO: 0.02 X10(3) UL (ref 0–1)
IMM GRANULOCYTES NFR BLD: 0.2 %
IMM GRANULOCYTES NFR BLD: 0.3 %
INR BLD: 1.08 (ref 0.85–1.16)
LYMPHOCYTES # BLD AUTO: 1.07 X10(3) UL (ref 1–4)
LYMPHOCYTES # BLD AUTO: 1.16 X10(3) UL (ref 1–4)
LYMPHOCYTES NFR BLD AUTO: 16.5 %
LYMPHOCYTES NFR BLD AUTO: 17 %
MCH RBC QN AUTO: 29.8 PG (ref 26–34)
MCH RBC QN AUTO: 29.9 PG (ref 26–34)
MCHC RBC AUTO-ENTMCNC: 32.2 G/DL (ref 31–37)
MCHC RBC AUTO-ENTMCNC: 32.4 G/DL (ref 31–37)
MCV RBC AUTO: 92.1 FL
MCV RBC AUTO: 92.7 FL
MONOCYTES # BLD AUTO: 0.38 X10(3) UL (ref 0.1–1)
MONOCYTES # BLD AUTO: 0.39 X10(3) UL (ref 0.1–1)
MONOCYTES NFR BLD AUTO: 5.6 %
MONOCYTES NFR BLD AUTO: 6 %
NEUTROPHILS # BLD AUTO: 4.89 X10 (3) UL (ref 1.5–7.7)
NEUTROPHILS # BLD AUTO: 4.89 X10(3) UL (ref 1.5–7.7)
NEUTROPHILS # BLD AUTO: 5.16 X10 (3) UL (ref 1.5–7.7)
NEUTROPHILS # BLD AUTO: 5.16 X10(3) UL (ref 1.5–7.7)
NEUTROPHILS NFR BLD AUTO: 75.6 %
NEUTROPHILS NFR BLD AUTO: 75.6 %
OSMOLALITY SERPL CALC.SUM OF ELEC: 296 MOSM/KG (ref 275–295)
PLATELET # BLD AUTO: 253 10(3)UL (ref 150–450)
PLATELET # BLD AUTO: 265 10(3)UL (ref 150–450)
POTASSIUM SERPL-SCNC: 4.8 MMOL/L (ref 3.5–5.1)
PROT SERPL-MCNC: 6.4 G/DL (ref 6.4–8.2)
PROTHROMBIN TIME: 14.1 SECONDS (ref 11.6–14.8)
RBC # BLD AUTO: 3.28 X10(6)UL
RBC # BLD AUTO: 3.92 X10(6)UL
RH BLOOD TYPE: POSITIVE
SODIUM SERPL-SCNC: 139 MMOL/L (ref 136–145)
WBC # BLD AUTO: 6.5 X10(3) UL (ref 4–11)
WBC # BLD AUTO: 6.8 X10(3) UL (ref 4–11)

## 2023-08-04 PROCEDURE — 78278 ACUTE GI BLOOD LOSS IMAGING: CPT | Performed by: EMERGENCY MEDICINE

## 2023-08-04 PROCEDURE — 0DJD8ZZ INSPECTION OF LOWER INTESTINAL TRACT, VIA NATURAL OR ARTIFICIAL OPENING ENDOSCOPIC: ICD-10-PCS | Performed by: INTERNAL MEDICINE

## 2023-08-04 PROCEDURE — 78830 RP LOCLZJ TUM SPECT W/CT 1: CPT | Performed by: EMERGENCY MEDICINE

## 2023-08-04 PROCEDURE — 99291 CRITICAL CARE FIRST HOUR: CPT | Performed by: HOSPITALIST

## 2023-08-04 RX ORDER — SODIUM CHLORIDE 9 MG/ML
INJECTION, SOLUTION INTRAVENOUS CONTINUOUS
Status: DISCONTINUED | OUTPATIENT
Start: 2023-08-04 | End: 2023-08-12

## 2023-08-04 RX ORDER — ECHINACEA 400 MG
1 CAPSULE ORAL DAILY
COMMUNITY

## 2023-08-04 RX ORDER — SENNOSIDES 8.6 MG
17.2 TABLET ORAL NIGHTLY PRN
Status: DISCONTINUED | OUTPATIENT
Start: 2023-08-04 | End: 2023-08-14

## 2023-08-04 RX ORDER — MELATONIN
3 NIGHTLY PRN
Status: DISCONTINUED | OUTPATIENT
Start: 2023-08-04 | End: 2023-08-14

## 2023-08-04 RX ORDER — ACETAMINOPHEN 500 MG
500 TABLET ORAL EVERY 4 HOURS PRN
Status: DISCONTINUED | OUTPATIENT
Start: 2023-08-04 | End: 2023-08-14

## 2023-08-04 RX ORDER — PHENYLEPHRINE HCL 10 MG/ML
VIAL (ML) INJECTION AS NEEDED
Status: DISCONTINUED | OUTPATIENT
Start: 2023-08-04 | End: 2023-08-05 | Stop reason: SURG

## 2023-08-04 RX ORDER — POLYETHYLENE GLYCOL 3350 17 G/17G
17 POWDER, FOR SOLUTION ORAL DAILY PRN
Status: DISCONTINUED | OUTPATIENT
Start: 2023-08-04 | End: 2023-08-14

## 2023-08-04 RX ORDER — TAMSULOSIN HYDROCHLORIDE 0.4 MG/1
0.4 CAPSULE ORAL
Status: DISCONTINUED | OUTPATIENT
Start: 2023-08-04 | End: 2023-08-10

## 2023-08-04 RX ORDER — ASPIRIN 81 MG/1
81 TABLET ORAL DAILY
COMMUNITY
End: 2023-08-14

## 2023-08-04 RX ORDER — SODIUM CHLORIDE, SODIUM LACTATE, POTASSIUM CHLORIDE, CALCIUM CHLORIDE 600; 310; 30; 20 MG/100ML; MG/100ML; MG/100ML; MG/100ML
INJECTION, SOLUTION INTRAVENOUS CONTINUOUS PRN
Status: DISCONTINUED | OUTPATIENT
Start: 2023-08-04 | End: 2023-08-05 | Stop reason: SURG

## 2023-08-04 RX ORDER — LIDOCAINE HYDROCHLORIDE 10 MG/ML
INJECTION, SOLUTION EPIDURAL; INFILTRATION; INTRACAUDAL; PERINEURAL AS NEEDED
Status: DISCONTINUED | OUTPATIENT
Start: 2023-08-04 | End: 2023-08-05 | Stop reason: SURG

## 2023-08-04 RX ORDER — METOCLOPRAMIDE HYDROCHLORIDE 5 MG/ML
10 INJECTION INTRAMUSCULAR; INTRAVENOUS EVERY 8 HOURS PRN
Status: DISCONTINUED | OUTPATIENT
Start: 2023-08-04 | End: 2023-08-14

## 2023-08-04 RX ORDER — ATORVASTATIN CALCIUM 40 MG/1
40 TABLET, FILM COATED ORAL NIGHTLY
COMMUNITY

## 2023-08-04 RX ORDER — ONDANSETRON 2 MG/ML
4 INJECTION INTRAMUSCULAR; INTRAVENOUS EVERY 6 HOURS PRN
Status: DISCONTINUED | OUTPATIENT
Start: 2023-08-04 | End: 2023-08-14

## 2023-08-04 RX ADMIN — PHENYLEPHRINE HCL 100 MCG: 10 MG/ML VIAL (ML) INJECTION at 18:30:00

## 2023-08-04 RX ADMIN — LIDOCAINE HYDROCHLORIDE 25 MG: 10 INJECTION, SOLUTION EPIDURAL; INFILTRATION; INTRACAUDAL; PERINEURAL at 18:15:00

## 2023-08-04 RX ADMIN — SODIUM CHLORIDE, SODIUM LACTATE, POTASSIUM CHLORIDE, CALCIUM CHLORIDE: 600; 310; 30; 20 INJECTION, SOLUTION INTRAVENOUS at 18:13:00

## 2023-08-04 RX ADMIN — PHENYLEPHRINE HCL 100 MCG: 10 MG/ML VIAL (ML) INJECTION at 18:20:00

## 2023-08-04 RX ADMIN — PHENYLEPHRINE HCL 100 MCG: 10 MG/ML VIAL (ML) INJECTION at 18:50:00

## 2023-08-04 NOTE — ED QUICK NOTES
Pt awake and alert, skin w/d,resps reg/unlabored. Pt denies complaints at this time. Pt denies pain. Pt aware we are awaiting bed assignment.

## 2023-08-04 NOTE — OPERATIVE REPORT
Colonoscopy Operative Report    [facility] Patient Status:  Inpatient    1940 MRN [id number]   Location [address] [address] Attending Michael Alexis MD   Hosp Day #   0 PCP Gisell Murillo MD     Pre-Operative Diagnosis: GI Bleed, tagged rbc scan + at splenic flexure    Post-Operative Diagnosis:  Numerous diverticulum transverse, descending, sigmoid colon. Old blood and clots lavaged. Clots and fibrous debris in rectum unable to suction. Second look at transverse and left colon. No active bleed or visible vessel. Normal terminal ileum, no clots or old blood right colon or transverse colon suggesting resolved diverticular bleed of left colon    Procedure Performed: Procedure(s):  COLONOSCOPY     Informed Consent: Informed consent for both the procedure and sedation were obtained from the patient. The potentially life-threatening complications of sedation, bleeding,  perforation, transfusion or repeat endoscopy  were reviewed along with the possible need for hospitalization, surgical management, transfusion or repeat endoscopy should one of these complications arise. The patient understands and is agreeable to proceed. Sedation Type: MAC-Patient received sedation with monitored anesthesia provided by an anesthesiologist      Cecum Withdrawal Time:  28 minutes  Date of previous colonoscopy:     Procedure Description: The patient was placed in the left lateral decubitus position. After careful digital rectal examination, the Adult colonoscope was inserted into the rectum and advanced to the level of the cecum under direct visualization. The cecum was identified by landmarks, including the appendiceal orifice and ileoceccal valve. Careful examination of the entire colon was performed during withdrawal of the endoscope. The scope was withdrawn to the rectum and retroflexion was performed.   The patient tolerated the procedure well with no immediate complications. The patient was transferred to the recovery area in stable condition. Quality of Preparation: Adequate  Aronchick Bowel Prep Scale:  good  Findings:   Numerous diverticulum transverse, descending, sigmoid colon. Old blood and clots lavaged. Clots and fibrous debris in rectum unable to suction. Second look at transverse and left colon. No active bleed or visible vessel. Normal terminal ileum, no clots or old blood right colon or transverse colon suggesting resolved diverticular bleed of left colon    Recommendations: clear liquid diet, hgb q8h, page GI if recurrent bleeding  Discharge: The patient was given an after visit summary detailing the procedure, findings, recommendations and follow up plans.      Darryle Fogo, MD  8/4/2023  6:03 PM

## 2023-08-04 NOTE — PROGRESS NOTES
Brief GI Note  Patient completed bowel prep.   Keep npo for colonoscopy 5:45 pm.    Eugenie Mejía MD  [facility] GI  [phone]

## 2023-08-04 NOTE — PROGRESS NOTES
NURSING ADMISSION NOTE      Patient admitted via Cart  Oriented to room. Safety precautions initiated. Bed in low position. Call light in reach. A/Ox4.

## 2023-08-04 NOTE — ED QUICK NOTES
Orders for admission, patient is aware of plan and ready to go upstairs.  Any questions, please call ED RN Aaron johnson [phone]     Patient Covid vaccination status: Fully vaccinated     COVID Test Ordered in ED: None    COVID Suspicion at Admission: N/A    Running Infusions:  None    Mental Status/LOC at time of transport: A+O x4, from home, uses two canes for ambulation at home    Other pertinent information:   CIWA score: N/A   NIH score:  N/A

## 2023-08-04 NOTE — PLAN OF CARE
Patient is A/Ox4. VSS. Afebrile. Patient denies pain. Patient is on RA. . No Cough/SOB. on Tele NSR. SCD on. Electrolyte protocol-non cardiac. NPO for colonoscopy today due to bloody stool. Denies any N/V. IVF. Patient and family updated on plan of care.     Problem: Patient/Family Goals  Goal: Patient/Family Long Term Goal  Description: Patient's Long Term Goal: Dc home      Interventions:  - follow plan of care  - See additional Care Plan goals for specific interventions  Outcome: Progressing  Goal: Patient/Family Short Term Goal  Description: Patient's Short Term Goal: Colonoscopy    Interventions:   - Golytely  - See additional Care Plan goals for specific interventions  Outcome: Progressing

## 2023-08-04 NOTE — PROGRESS NOTES
08/04/23 1310   CHEYENNE Category 1   Do you snore? 0   Your snoring is 0   How often do you snore? 0   Snoring bothers other people? 0   Quit breathing during sleep? 0   CHEYENNE Category 1  0   CHEYENNE Category 2   Feel tired or fatigued after sleep? 0   Feel tired or not up to par while awake? 0   Nod off or fall asleep  while driving? 0   If nod off or fall asleep while driving, how often? 0   CHEYENNE Category 2 0   CHEYENNE Category 3   Has high blood pressure?  0   Recorded BMI over 30? 1   CHEYENNE Category 3 1   Obstructive Sleep Apnea Risk   CHEYENNE Categories TOTAL 1   CHEYENNE Risk Level Lower CHEYENNE risk

## 2023-08-04 NOTE — ED QUICK NOTES
PCT assisted pt to bathroom per wheelchair and use of cane. Had loose bloody stool. Continues to deny pain. Denies dizziness.

## 2023-08-05 LAB
ANION GAP SERPL CALC-SCNC: 4 MMOL/L (ref 0–18)
BASOPHILS # BLD AUTO: 0.01 X10(3) UL (ref 0–0.2)
BASOPHILS NFR BLD AUTO: 0.1 %
BUN BLD-MCNC: 22 MG/DL (ref 7–18)
CALCIUM BLD-MCNC: 7.8 MG/DL (ref 8.5–10.1)
CHLORIDE SERPL-SCNC: 110 MMOL/L (ref 98–112)
CO2 SERPL-SCNC: 24 MMOL/L (ref 21–32)
CREAT BLD-MCNC: 0.56 MG/DL
EGFRCR SERPLBLD CKD-EPI 2021: 98 ML/MIN/1.73M2 (ref 60–?)
EOSINOPHIL # BLD AUTO: 0.04 X10(3) UL (ref 0–0.7)
EOSINOPHIL NFR BLD AUTO: 0.5 %
ERYTHROCYTE [DISTWIDTH] IN BLOOD BY AUTOMATED COUNT: 13.2 %
GLUCOSE BLD-MCNC: 111 MG/DL (ref 70–99)
HCT VFR BLD AUTO: 23.7 %
HGB BLD-MCNC: 7.4 G/DL
HGB BLD-MCNC: 7.8 G/DL
HGB BLD-MCNC: 8.4 G/DL
IMM GRANULOCYTES # BLD AUTO: 0.01 X10(3) UL (ref 0–1)
IMM GRANULOCYTES NFR BLD: 0.1 %
LYMPHOCYTES # BLD AUTO: 0.84 X10(3) UL (ref 1–4)
LYMPHOCYTES NFR BLD AUTO: 10.9 %
MCH RBC QN AUTO: 30.6 PG (ref 26–34)
MCHC RBC AUTO-ENTMCNC: 32.9 G/DL (ref 31–37)
MCV RBC AUTO: 92.9 FL
MONOCYTES # BLD AUTO: 0.36 X10(3) UL (ref 0.1–1)
MONOCYTES NFR BLD AUTO: 4.7 %
NEUTROPHILS # BLD AUTO: 6.45 X10 (3) UL (ref 1.5–7.7)
NEUTROPHILS # BLD AUTO: 6.45 X10(3) UL (ref 1.5–7.7)
NEUTROPHILS NFR BLD AUTO: 83.7 %
OSMOLALITY SERPL CALC.SUM OF ELEC: 290 MOSM/KG (ref 275–295)
PLATELET # BLD AUTO: 217 10(3)UL (ref 150–450)
POTASSIUM SERPL-SCNC: 4.1 MMOL/L (ref 3.5–5.1)
RBC # BLD AUTO: 2.55 X10(6)UL
SODIUM SERPL-SCNC: 138 MMOL/L (ref 136–145)
WBC # BLD AUTO: 7.7 X10(3) UL (ref 4–11)

## 2023-08-05 NOTE — PHYSICAL THERAPY NOTE
PHYSICAL THERAPY EVALUATION - INPATIENT     Room Number: 529/529-A  Evaluation Date: 8/5/2023  Type of Evaluation: Initial  Physician Order: PT Eval and Treat    Presenting Problem: rectal bleeding  Co-Morbidities : gastritis, LGIB, HL  Reason for Therapy: Mobility Dysfunction and Discharge Planning    History related to current admission: Patient is a 79year old male admitted on 8/4/2023 from home for rectal bleeding. Pt diagnosed with splenic rupture. COLONOSCOPY COMPLETED 8/4: Numerous diverticulum transverse, descending, sigmoid colon. Old blood and clots lavaged. Clots and fibrous debris in rectum unable to suction. Second look at transverse and left colon. No active bleed or visible vessel. Normal terminal ileum, no clots or old blood right colon or transverse colon suggesting resolved diverticular bleed of left colon    Imaging: NM GI Blood Loss: Abnormal uptake within the splenic flexure consistent with active bleeding. Correlate clinically. RRT called 8/4 due to splenic rupture   ASSESSMENT   In this PT evaluation, the patient presents with the following impairments orthostatic hypotension with symptoms of dizziness and nausea, nystagmus, decr balance, decr activity tolerance/endurance, decr functional mobility. These impairments and comorbidities manifest themselves as functional limitations in independent bed mobility, transfers, and gait. The patient is below baseline and would benefit from skilled inpatient PT to address the above deficits to assist patient in returning to prior to level of function. Functional outcome measures completed include AMPAC. The AM-PAC '6-Clicks' Inpatient Basic Mobility Short Form was completed and this patient is demonstrating a Approx Degree of Impairment: 61.29%  degree of impairment in mobility. Research supports that patients with this level of impairment may benefit from HHPT/OT .     DISCHARGE RECOMMENDATIONS  PT Discharge Recommendations: Home with home health PT/OT    PLAN  PT Treatment Plan: Bed mobility; Body mechanics; Endurance; Patient education; Energy conservation; Family education;Gait training;Neuromuscular re-educate;Range of motion;Strengthening;Stoop training;Stair training;Transfer training;Balance training  Rehab Potential : Good  Frequency (Obs): 3-5x/week  Number of Visits to Meet Established Goals: 5      CURRENT GOALS    Goal #1 Patient is able to demonstrate supine - sit EOB @ level: modified independent     Goal #2 Patient is able to demonstrate transfers Sit to/from Stand at assistance level: modified independent     Goal #3 Patient is able to ambulate 50 feet with assist device: walker - rolling at assistance level: supervision     Goal #4 Pt able to ascend/descend 7 steps with supervision for home navigation. Goal #5    Goal #6    Goal Comments: Goals established on 8/5/2023    HOME SITUATION  Type of Home: House   Home Layout: Multi-level        Stairs to Bedroom: 7  Railing: Yes    Lives With: Son;Other (Comment) (daughter in law)  Drives: Yes  Patient Owned Equipment: Rolling walker;Cane;Rollator  Patient Regularly Uses: Glasses    Prior Level of [place]: Per pt- typically independent with all aspects of functional mobility. Lives with son and daughter in law in multi-level home. Son works nights, but DIL home. Son home during the day. Had a recent fall on 7/31/23 while trying to  case of water, and injured 4th digit on right hand. Owns rollator, RW, and two canes- depending on the day may ambulate with RW or may ambulate with canes. Pt reports he sees an OP PT for lymphedema. He is currently seeing a dietician for weight loss as Dr. Woo Corbin wants him to be down to certain weight before proceeding with R CARMEN. SUBJECTIVE  \"I thought there was a bug on the ceiling because I was so dizzy the hole in the ceiling was moving. \"      OBJECTIVE  Precautions: Bed/chair alarm (orthostatic BP)  Fall Risk: High fall risk    WEIGHT BEARING RESTRICTION  Weight Bearing Restriction: None                PAIN ASSESSMENT  Rating: Unable to rate  Location: R hip  Management Techniques: Body mechanics; Activity promotion;Repositioning    COGNITION  Overall Cognitive Status:  WFL - within functional limits    RANGE OF MOTION AND STRENGTH ASSESSMENT  Upper extremity ROM and strength are within functional limits     Lower extremity ROM is within functional limits     Lower extremity strength is within functional limits       BALANCE  Static Sitting: Fair  Dynamic Sitting: Fair -  Static Standing: Not tested  Dynamic Standing: Not tested    ADDITIONAL TESTS                                    ACTIVITY TOLERANCE                         O2 WALK       NEUROLOGICAL FINDINGS                        AM-PAC '6-Clicks' INPATIENT SHORT FORM - BASIC MOBILITY  How much difficulty does the patient currently have. .. Patient Difficulty: Turning over in bed (including adjusting bedclothes, sheets and blankets)?: A Little   Patient Difficulty: Sitting down on and standing up from a chair with arms (e.g., wheelchair, bedside commode, etc.): A Lot   Patient Difficulty: Moving from lying on back to sitting on the side of the bed?: A Little   How much help from another person does the patient currently need. ..    Help from Another: Moving to and from a bed to a chair (including a wheelchair)?: A Lot   Help from Another: Need to walk in hospital room?: A Lot   Help from Another: Climbing 3-5 steps with a railing?: A Lot       AM-PAC Score:  Raw Score: 14   Approx Degree of Impairment: 61.29%   Standardized Score (AM-PAC Scale): 38.1   CMS Modifier (G-Code): CL    FUNCTIONAL ABILITY STATUS  Gait Assessment   Functional Mobility/Gait Assessment  Gait Assistance: Not tested    Skilled Therapy Provided     Bed Mobility:  Rolling: supervision  Supine to sit: supervision w/ HOB elevated (pt sleeps in recliner chair)   Sit to supine: maxA      Transfer Mobility:  NT - unsafe at this time due to orthostatic blood pressure    Therapist's Comments: Patient presents sitting up in bed. Discussed role and goal of physical therapy in hospital setting. Pt in agreement. Reports he needs a R hip replacement so he has pain there. Supine BP taken at: 108/57. Pt reporting no dizziness lying down. With [facility] elevated, pt able to complete supine to sitting EOB at supervision/CGA. Pt requiring incr time to complete task. Once sitting EOB, pt reporting severe dizziness. BP in sittin/49. Attempted to complete sit to stand, but upon lifting buttocks off bed pt immediately reported severe dizziness. Once in sitting, pt with nystagmus bilaterally and reports sudden onset of nausea. Pt returned to supine position at maxA x 2 and boosting also at maxA x 2. BP lying down: 71/48. Pt noted to have significant amount of blood coming from buttocks, as there was blood all over the linens and chang pads- RN made aware. After about 5 minutes, BP taken again while laying supine with HOB slightly elevated, at 114/61. Pt reporting he feels better. Asked pt if he has ever had vertigo before and per patient he had a bout of it about 3 months ago. Due to orthostatic blood pressure, pt unsafe for any further mobility at this time. RN made aware. Rec HHPT/OT upon discharge. Exercise/Education Provided:  Bed mobility  Body mechanics  Energy conservation  Functional activity tolerated    Patient End of Session: In bed;Needs met;Call light within reach;RN aware of session/findings; All patient questions and concerns addressed; Alarm set; Discussed recommendations with /      Patient Evaluation Complexity Level:  History Moderate - 1 or 2 personal factors and/or co-morbidities   Examination of body systems Moderate - addressing a total of 3 or more elements   Clinical Presentation Moderate - Evolving   Clinical Decision Making Moderate - Evolving       PT Session Time: 35 minutes  Gait Training: minutes  Therapeutic Activity: 15 minutes  Neuromuscular Re-education:  minutes  Therapeutic Exercise:  minutes

## 2023-08-05 NOTE — PROGRESS NOTES
08/05/23 1847   Clinical Encounter Type   Visited With Patient and family together   Routine Visit Introduction   Referral From Nurse   Referral To    Taxonomy   Intended Effects Aligning care plan with patient's values   Methods Offer support   Interventions Assist someone with Advance Directives     The  responded to the spiritual care consult for [place] [facility].  spoke with patient and family at bedside. Patient would like to discuss information with spouse.  left the Power of Health Care packet with patient and encourage to call if interested in completing. Spiritual Care support can be requested via an Epic consult.       [id number]

## 2023-08-05 NOTE — ANESTHESIA POSTPROCEDURE EVALUATION
Everett Murphy Patient Status:  Inpatient   Age/Gender 79year old male MRN [id number]   [facility] 5NW-A Attending Wade Chavez MD   Hosp Day # 1 PCP Pavan England MD       Anesthesia Post-op Note    COLONOSCOPY    Procedure Summary       Date: 08/04/23 Room / Location: [facility] ENDOSCOPY 03 / 1404 [facility] ENDOSCOPY    Anesthesia Start: 1826 Anesthesia Stop: 1915    Procedure: COLONOSCOPY Diagnosis: (diverticulosis)    Surgeons: Elvia Goel MD Anesthesiologist: Jayda Savage MD    Anesthesia Type: MAC ASA Status: 3            Anesthesia Type: MAC    Vitals Value Taken Time   /66 08/04/23 1916   Temp 98.0 08/04/23 1915   Pulse 81 08/04/23 1920   Resp 16 08/04/23 1920   SpO2 100 % 08/04/23 1920   Vitals shown include unvalidated device data. Patient Location: Endoscopy    Anesthesia Type: MAC    Airway Patency: patent    Postop Pain Control: adequate    Mental Status: mildly sedated but able to meaningfully participate in the post-anesthesia evaluation    Nausea/Vomiting: none    Cardiopulmonary/Hydration status: stable euvolemic    Complications: no apparent anesthesia related complications    Postop vital signs: stable    Dental Exam: Unchanged from Preop    Patient to be discharged from PACU when criteria met.

## 2023-08-05 NOTE — PLAN OF CARE
Received pt at shift change. Pt axox4. Pt had a large and old blood BM. This afternoon after PT/OT pt was orthostatic in the 70's, and had a large red soft bloody stool. Called for stat lab and inform Md, orders received, will look for further orders after lab results.     All questions and concerns addressed, support given, will monitor

## 2023-08-05 NOTE — PLAN OF CARE
Pt returned from Colonoscopy. No active rectal bleeding noted. Awake, A/Ox4, VSS, on RA, NSR on tele monitoring. Skin pale, warm and dry to touch. On CLD, tolerating well. Abdomen rounded, soft, bowel sounds present, denies any abdominal discomfort at present. Pt states feels dizzy when staff turning him in bed but resolves quickly, will cont to monitor. Hg ordered q8 hrs, due for draw at midnight. Pt updated with plan of care, verbalized understanding.

## 2023-08-06 LAB
ANION GAP SERPL CALC-SCNC: 8 MMOL/L (ref 0–18)
BASOPHILS # BLD AUTO: 0.01 X10(3) UL (ref 0–0.2)
BASOPHILS NFR BLD AUTO: 0.2 %
BUN BLD-MCNC: 13 MG/DL (ref 7–18)
CALCIUM BLD-MCNC: 7.9 MG/DL (ref 8.5–10.1)
CHLORIDE SERPL-SCNC: 110 MMOL/L (ref 98–112)
CO2 SERPL-SCNC: 22 MMOL/L (ref 21–32)
CREAT BLD-MCNC: 0.69 MG/DL
EGFRCR SERPLBLD CKD-EPI 2021: 92 ML/MIN/1.73M2 (ref 60–?)
EOSINOPHIL # BLD AUTO: 0.09 X10(3) UL (ref 0–0.7)
EOSINOPHIL NFR BLD AUTO: 1.4 %
ERYTHROCYTE [DISTWIDTH] IN BLOOD BY AUTOMATED COUNT: 13.5 %
GLUCOSE BLD-MCNC: 143 MG/DL (ref 70–99)
HCT VFR BLD AUTO: 24.8 %
HGB BLD-MCNC: 6.6 G/DL
HGB BLD-MCNC: 8 G/DL
IMM GRANULOCYTES # BLD AUTO: 0.03 X10(3) UL (ref 0–1)
IMM GRANULOCYTES NFR BLD: 0.5 %
LYMPHOCYTES # BLD AUTO: 0.8 X10(3) UL (ref 1–4)
LYMPHOCYTES NFR BLD AUTO: 12.2 %
MCH RBC QN AUTO: 30.2 PG (ref 26–34)
MCHC RBC AUTO-ENTMCNC: 32.3 G/DL (ref 31–37)
MCV RBC AUTO: 93.6 FL
MONOCYTES # BLD AUTO: 0.26 X10(3) UL (ref 0.1–1)
MONOCYTES NFR BLD AUTO: 4 %
NEUTROPHILS # BLD AUTO: 5.39 X10 (3) UL (ref 1.5–7.7)
NEUTROPHILS # BLD AUTO: 5.39 X10(3) UL (ref 1.5–7.7)
NEUTROPHILS NFR BLD AUTO: 81.7 %
OSMOLALITY SERPL CALC.SUM OF ELEC: 293 MOSM/KG (ref 275–295)
PLATELET # BLD AUTO: 215 10(3)UL (ref 150–450)
POTASSIUM SERPL-SCNC: 3.5 MMOL/L (ref 3.5–5.1)
RBC # BLD AUTO: 2.65 X10(6)UL
SODIUM SERPL-SCNC: 140 MMOL/L (ref 136–145)
WBC # BLD AUTO: 6.6 X10(3) UL (ref 4–11)

## 2023-08-06 PROCEDURE — 30233N1 TRANSFUSION OF NONAUTOLOGOUS RED BLOOD CELLS INTO PERIPHERAL VEIN, PERCUTANEOUS APPROACH: ICD-10-PCS | Performed by: INTERNAL MEDICINE

## 2023-08-06 RX ORDER — SODIUM CHLORIDE 9 MG/ML
INJECTION, SOLUTION INTRAVENOUS ONCE
Status: COMPLETED | OUTPATIENT
Start: 2023-08-06 | End: 2023-08-06

## 2023-08-06 NOTE — PROGRESS NOTES
08/05/23 1907   Clinical Encounter Type   Visited With Patient   Routine Visit Introduction   Continue Visiting No   Referral From Nurse   Referral To    Taxonomy   Intended Effects Aligning care plan with patient's values   Methods Collaborate with care team member;Offer support   Interventions Assist someone with Advance Directives      responded to the spiritual consult list for Power of Health Care.  spoke with patient. Patient currently has a Power of Health Care at home.  encouraged patient to submit paperwork for EPIC charting. Spiritual Care support can be requested via an Epic consult.       [address] Dan[address]

## 2023-08-06 NOTE — PROGRESS NOTES
Patient with Hgb levels q 8 hrs, Hgb=6.6 at midnight draw, notified Dr. Finesse Martinez, Order to transfuse 1 unit PRBC received. Obtained signed consent for transfusion from patient, transfused  of 1 Unit PRBC's per protocol, patient tolerated well, VS remained stable. Hgb 45 min post transfusion ordered. Awaiting blood draw. No BM overnight, no signs of active bleeding.

## 2023-08-07 LAB
ANION GAP SERPL CALC-SCNC: 7 MMOL/L (ref 0–18)
BASOPHILS # BLD AUTO: 0.01 X10(3) UL (ref 0–0.2)
BASOPHILS NFR BLD AUTO: 0.2 %
BLOOD TYPE BARCODE: 5100
BUN BLD-MCNC: 11 MG/DL (ref 7–18)
CALCIUM BLD-MCNC: 7.9 MG/DL (ref 8.5–10.1)
CHLORIDE SERPL-SCNC: 114 MMOL/L (ref 98–112)
CO2 SERPL-SCNC: 21 MMOL/L (ref 21–32)
CREAT BLD-MCNC: 0.59 MG/DL
EGFRCR SERPLBLD CKD-EPI 2021: 96 ML/MIN/1.73M2 (ref 60–?)
EOSINOPHIL # BLD AUTO: 0.1 X10(3) UL (ref 0–0.7)
EOSINOPHIL NFR BLD AUTO: 1.8 %
ERYTHROCYTE [DISTWIDTH] IN BLOOD BY AUTOMATED COUNT: 13.8 %
GLUCOSE BLD-MCNC: 103 MG/DL (ref 70–99)
HCT VFR BLD AUTO: 23.1 %
HGB BLD-MCNC: 7.5 G/DL
IMM GRANULOCYTES # BLD AUTO: 0.03 X10(3) UL (ref 0–1)
IMM GRANULOCYTES NFR BLD: 0.5 %
LYMPHOCYTES # BLD AUTO: 0.93 X10(3) UL (ref 1–4)
LYMPHOCYTES NFR BLD AUTO: 16.9 %
MCH RBC QN AUTO: 30.6 PG (ref 26–34)
MCHC RBC AUTO-ENTMCNC: 32.5 G/DL (ref 31–37)
MCV RBC AUTO: 94.3 FL
MONOCYTES # BLD AUTO: 0.34 X10(3) UL (ref 0.1–1)
MONOCYTES NFR BLD AUTO: 6.2 %
NEUTROPHILS # BLD AUTO: 4.09 X10 (3) UL (ref 1.5–7.7)
NEUTROPHILS # BLD AUTO: 4.09 X10(3) UL (ref 1.5–7.7)
NEUTROPHILS NFR BLD AUTO: 74.4 %
OSMOLALITY SERPL CALC.SUM OF ELEC: 294 MOSM/KG (ref 275–295)
PLATELET # BLD AUTO: 199 10(3)UL (ref 150–450)
POTASSIUM SERPL-SCNC: 3.7 MMOL/L (ref 3.5–5.1)
POTASSIUM SERPL-SCNC: 3.7 MMOL/L (ref 3.5–5.1)
RBC # BLD AUTO: 2.45 X10(6)UL
SODIUM SERPL-SCNC: 142 MMOL/L (ref 136–145)
UNIT VOLUME: 350 ML
WBC # BLD AUTO: 5.5 X10(3) UL (ref 4–11)

## 2023-08-07 RX ORDER — POTASSIUM CHLORIDE 20 MEQ/1
40 TABLET, EXTENDED RELEASE ORAL ONCE
Status: COMPLETED | OUTPATIENT
Start: 2023-08-07 | End: 2023-08-07

## 2023-08-07 NOTE — CM/SW NOTE
Pt admitted for GI bleed. Pt is  and lives with his son and dtr-in-law. He has been independent for his adls. Pt is experiencing some weakness. PT is recommending HH. Met with pt who says he has been going to outpt PT and would like to continue with that instead of having HHPT. Pt will go to Outpt PT upon dc.

## 2023-08-07 NOTE — PROGRESS NOTES
Assumed care at 7:30 am  Patient is alert and oriented x 4  States he's feeling a little dizzy. Will message hospitalist to come by and talk to him. All safety precautions in place. Will continue to monitor patient.

## 2023-08-07 NOTE — PLAN OF CARE
Patient A&Ox4. Patient resting in bed, no apparent distress. Patient resting on RA. Patient remains on tele and  monitoring. No signs of bleeding. IVF. See morning labs. Safety/fall precautions in place. Call light in reach. Problem: Patient/Family Goals  Goal: Patient/Family Long Term Goal  Description: Patient's Long Term Goal: To have bleeding fully resolved and will have no recurrence. Interventions:  - home safety  - activity as directed  - monitoring of symptoms  - See additional Care Plan goals for specific interventions  Outcome: Progressing  Goal: Patient/Family Short Term Goal  Description: Patient's Short Term Goal: To have dizziness improve and able to get out of bed tomorrow. 8/6: safety, monitor Hgb, monitor for bleeding    Interventions:   - CLD  - IVF  - serial Hg  - monitor for further or increase in rectal bleeding  - See additional Care Plan goals for specific interventions  Outcome: Progressing     Problem: SAFETY ADULT - FALL  Goal: Free from fall injury  Description: INTERVENTIONS:  - Assess pt frequently for physical needs  - Identify cognitive and physical deficits and behaviors that affect risk of falls.   - [place] fall precautions as indicated by assessment.  - Educate pt/family on patient safety including physical limitations  - Instruct pt to call for assistance with activity based on assessment  - Modify environment to reduce risk of injury  - Provide assistive devices as appropriate  - Consider OT/PT consult to assist with strengthening/mobility  - Encourage toileting schedule  Outcome: Progressing     Problem: GASTROINTESTINAL - ADULT  Goal: Maintains or returns to baseline bowel function  Description: INTERVENTIONS:  - Assess bowel function  - Maintain adequate hydration with IV or PO as ordered and tolerated  - Evaluate effectiveness of GI medications  - Encourage mobilization and activity  - Obtain nutritional consult as needed  - Establish a toileting routine/schedule  - Consider collaborating with pharmacy to review patient's medication profile  Outcome: Progressing

## 2023-08-07 NOTE — PHYSICAL THERAPY NOTE
PHYSICAL THERAPY TREATMENT NOTE - INPATIENT    Room Number: 529/529-A     Session: 1/5     Number of Visits to Meet Established Goals: 5    Presenting Problem: rectal bleeding  Co-Morbidities : H/o gastritis, LGIB, HL, lymphedema, L CARMEN, B TKA (working w/ wt loss clinic to get ready for right hip replacement); reports recent onset of vertigo 3 months ago'    History related to current admission: Patient is a 79year old male admitted on 8/4/2023 from home for rectal bleeding. Pt diagnosed with splenic rupture. COLONOSCOPY COMPLETED 8/4: Numerous diverticulum transverse, descending, sigmoid colon. Old blood and clots lavaged. Clots and fibrous debris in rectum unable to suction. Second look at transverse and left colon. No active bleed or visible vessel. Normal terminal ileum, no clots or old blood right colon or transverse colon suggesting resolved diverticular bleed of left colon     Imaging: NM GI Blood Loss: Abnormal uptake within the splenic flexure consistent with active bleeding. Correlate clinically. RRT called 8/4 due to splenic rupture     Received PRBC unit on 8/6 due to low HgB (6/6)    ASSESSMENT     Patient continues to present with the following limitations: orthostatic BP, symptoms of dizziness, decr balance, decr activity tolerance/endurance, decr functional mobility. These impairments present themselves as barriers to independent bed mobility, transfers, and ambulation. Pt continues to have orthostatic BP limiting his overall ability to participate in therapy. He was able to tolerate standing much better today than previous session. Tolerated sitting EOB ~15 minutes to complete LE therapeutic exercises. Pt reports he does not want HH therapy, and would much rather prefer to go to OP PT as he has history of session with them before. He states his son would be able to provide him transportation.     DISCHARGE RECOMMENDATIONS  PT Discharge Recommendations: Outpatient PT     PLAN  PT Treatment Plan: Bed mobility; Body mechanics; Endurance; Patient education; Energy conservation; Family education;Gait training;Neuromuscular re-educate;Range of motion;Strengthening;Stoop training;Stair training;Transfer training;Balance training  Rehab Potential : Good  Frequency (Obs): 3-5x/week    CURRENT GOALS     Goal #1 Patient is able to demonstrate supine - sit EOB @ level: modified independent      Goal #2 Patient is able to demonstrate transfers Sit to/from Stand at assistance level: modified independent      Goal #3 Patient is able to ambulate 50 feet with assist device: walker - rolling at assistance level: supervision      Goal #4 Pt able to ascend/descend 7 steps with supervision for home navigation. Goal #5     Goal #6     Goal Comments: Goals established on 8/5/2023 8/7/2023 all goals ongoing      SUBJECTIVE  \"I actually feel the best right now since I have been here. \"    OBJECTIVE  Precautions: Bed/chair alarm (orthostatic BP)    WEIGHT BEARING RESTRICTION  Weight Bearing Restriction: None                PAIN ASSESSMENT   Rating: Unable to rate  Location: R hip  Management Techniques: Body mechanics; Activity promotion;Repositioning    BALANCE                                                                                                                       Static Sitting: Fair  Dynamic Sitting: Fair -           Static Standing: Fair -  Dynamic Standing: Poor +    ACTIVITY TOLERANCE           BP: 91/50  BP Location: Right arm  BP Method: Automatic  Patient Position: Standing    O2 WALK         AM-PAC '6-Clicks' INPATIENT SHORT FORM - BASIC MOBILITY  How much difficulty does the patient currently have. ..   Patient Difficulty: Turning over in bed (including adjusting bedclothes, sheets and blankets)?: A Little   Patient Difficulty: Sitting down on and standing up from a chair with arms (e.g., wheelchair, bedside commode, etc.): A Little   Patient Difficulty: Moving from lying on back to sitting on the side of the bed?: A Lot   How much help from another person does the patient currently need. .. Help from Another: Moving to and from a bed to a chair (including a wheelchair)?: A Little   Help from Another: Need to walk in hospital room?: A Little   Help from Another: Climbing 3-5 steps with a railing?: A Lot       AM-PAC Score:  Raw Score: 16   Approx Degree of Impairment: 54.16%   Standardized Score (AM-PAC Scale): 40.78   CMS Modifier (G-Code): CK    FUNCTIONAL ABILITY STATUS  Gait Assessment   Functional Mobility/Gait Assessment  Gait Assistance: Not tested    Skilled Therapy Provided    Bed Mobility:  Rolling: NT   Supine<>Sit: [place]    Sit<>Supine: [place] - assist to bring LE back onto bed. Pt typically sleeps in recliner chair so not typically a barrier he runs into. Transfer Mobility:  Sit<>Stand: CGA/supervision to RW    Stand<>Sit: CGA/supervision to RW   Gait: NT    Therapist's Comments: Patient presents sitting in bed. Agreeable to follow up therapy session. Supine BP: 115/34. HOB brought up to about 75 degrees to \"sitting position\" /49, asymptomatic. With HOB elevated, supine to sitting EOB completed at [place] (assist to bring LE's off bed- pt able to scoot legs close to EOB). Pt uses bedrail to bring trunk forward. Again, pt does not sleep in bed, but rather recliner so not a typical barrier he runs into. Sitting EOB BP: 132/69. Sit to stand w/ RW completed at [facility]. Standing BP: 91/54, again asymptomatic. Pt returned to sitting position. RN called into room, as pt is asymptomatic but continues to have orthostatic BP. BP taken again in standing 6 minutes later: 91/50, minor dizziness reported. RN requesting no ambulation trial today. Pt able to tolerate 15 minutes of seated LE and UE therapeutic exercises: see below. Pt completed sit to stand w/ RW at supervision. Took 4 lateral steps with RW towards HOB.  Pt reporting incr dizziness and feeling \"woozy\" Once completed, sit to supine completed at [place]- again assist required at lower extremities to bring back onto bed. Pt still far down in the bed, reclined bed to assist patient with boosting, but pt insisting on attempting himself. With incr time and multiple bridges pt able to scoot self up in bed. Pt expressing how grateful he was for therapy session today. Stated that he actually felt his heart pumping the blood through his body, and even felt as if he worked up a bit of a sweat. RN requesting pt be seen tomorrow morning for ambulation trial if blood pressures remain stable. Pt is very motivated to participate in therapy and will continue to benefit from IP PT services. THERAPEUTIC EXERCISES  Lower Extremity Alternating marching  Heel raises  Heel slides  LAQ  Toe raises     Upper Extremity Elbow flex/ext and  - open/close     Position Sitting     Repetitions   10   Sets   1-2     Patient End of Session: In bed;Needs met;Call light within reach;RN aware of session/findings; All patient questions and concerns addressed; Discussed recommendations with /    PT Session Time: 45 minutes  Gait Training:  minutes  Therapeutic Activity: 30 minutes  Therapeutic Exercise: 15 minutes   Neuromuscular Re-education:  minutes

## 2023-08-08 ENCOUNTER — ANESTHESIA EVENT (OUTPATIENT)
Dept: ENDOSCOPY | Facility: HOSPITAL | Age: 83
End: 2023-08-08
Payer: MEDICARE

## 2023-08-08 ENCOUNTER — ANESTHESIA (OUTPATIENT)
Dept: ENDOSCOPY | Facility: HOSPITAL | Age: 83
End: 2023-08-08
Payer: MEDICARE

## 2023-08-08 ENCOUNTER — APPOINTMENT (OUTPATIENT)
Dept: CT IMAGING | Facility: HOSPITAL | Age: 83
End: 2023-08-08
Attending: INTERNAL MEDICINE
Payer: MEDICARE

## 2023-08-08 LAB
ANION GAP SERPL CALC-SCNC: 3 MMOL/L (ref 0–18)
ANTIBODY SCREEN: NEGATIVE
BASOPHILS # BLD AUTO: 0.01 X10(3) UL (ref 0–0.2)
BASOPHILS NFR BLD AUTO: 0.2 %
BUN BLD-MCNC: 10 MG/DL (ref 7–18)
CALCIUM BLD-MCNC: 7.8 MG/DL (ref 8.5–10.1)
CHLORIDE SERPL-SCNC: 115 MMOL/L (ref 98–112)
CO2 SERPL-SCNC: 25 MMOL/L (ref 21–32)
CREAT BLD-MCNC: 0.48 MG/DL
EGFRCR SERPLBLD CKD-EPI 2021: 102 ML/MIN/1.73M2 (ref 60–?)
EOSINOPHIL # BLD AUTO: 0.18 X10(3) UL (ref 0–0.7)
EOSINOPHIL NFR BLD AUTO: 3.3 %
ERYTHROCYTE [DISTWIDTH] IN BLOOD BY AUTOMATED COUNT: 13.9 %
GLUCOSE BLD-MCNC: 99 MG/DL (ref 70–99)
HCT VFR BLD AUTO: 21.4 %
HGB BLD-MCNC: 6.9 G/DL
HGB BLD-MCNC: 7.6 G/DL
HGB BLD-MCNC: 7.9 G/DL
IMM GRANULOCYTES # BLD AUTO: 0.02 X10(3) UL (ref 0–1)
IMM GRANULOCYTES NFR BLD: 0.4 %
IRON SATN MFR SERPL: 13 %
IRON SERPL-MCNC: 27 UG/DL
LYMPHOCYTES # BLD AUTO: 0.87 X10(3) UL (ref 1–4)
LYMPHOCYTES NFR BLD AUTO: 16.2 %
MCH RBC QN AUTO: 30.4 PG (ref 26–34)
MCHC RBC AUTO-ENTMCNC: 32.2 G/DL (ref 31–37)
MCV RBC AUTO: 94.3 FL
MONOCYTES # BLD AUTO: 0.31 X10(3) UL (ref 0.1–1)
MONOCYTES NFR BLD AUTO: 5.8 %
NEUTROPHILS # BLD AUTO: 3.99 X10 (3) UL (ref 1.5–7.7)
NEUTROPHILS # BLD AUTO: 3.99 X10(3) UL (ref 1.5–7.7)
NEUTROPHILS NFR BLD AUTO: 74.1 %
OSMOLALITY SERPL CALC.SUM OF ELEC: 295 MOSM/KG (ref 275–295)
PLATELET # BLD AUTO: 216 10(3)UL (ref 150–450)
POTASSIUM SERPL-SCNC: 3.8 MMOL/L (ref 3.5–5.1)
POTASSIUM SERPL-SCNC: 3.8 MMOL/L (ref 3.5–5.1)
RBC # BLD AUTO: 2.27 X10(6)UL
RH BLOOD TYPE: POSITIVE
SODIUM SERPL-SCNC: 143 MMOL/L (ref 136–145)
TIBC SERPL-MCNC: 206 UG/DL (ref 240–450)
TRANSFERRIN SERPL-MCNC: 138 MG/DL (ref 200–360)
WBC # BLD AUTO: 5.4 X10(3) UL (ref 4–11)

## 2023-08-08 PROCEDURE — 74174 CTA ABD&PLVS W/CONTRAST: CPT | Performed by: INTERNAL MEDICINE

## 2023-08-08 PROCEDURE — 0DJD8ZZ INSPECTION OF LOWER INTESTINAL TRACT, VIA NATURAL OR ARTIFICIAL OPENING ENDOSCOPIC: ICD-10-PCS | Performed by: INTERNAL MEDICINE

## 2023-08-08 RX ORDER — NICOTINE POLACRILEX 4 MG
15 LOZENGE BUCCAL
Status: DISCONTINUED | OUTPATIENT
Start: 2023-08-08 | End: 2023-08-08 | Stop reason: HOSPADM

## 2023-08-08 RX ORDER — POTASSIUM CHLORIDE 20 MEQ/1
40 TABLET, EXTENDED RELEASE ORAL ONCE
Status: COMPLETED | OUTPATIENT
Start: 2023-08-08 | End: 2023-08-08

## 2023-08-08 RX ORDER — NICOTINE POLACRILEX 4 MG
30 LOZENGE BUCCAL
Status: DISCONTINUED | OUTPATIENT
Start: 2023-08-08 | End: 2023-08-08 | Stop reason: HOSPADM

## 2023-08-08 RX ORDER — SODIUM CHLORIDE 9 MG/ML
INJECTION, SOLUTION INTRAVENOUS ONCE
Status: COMPLETED | OUTPATIENT
Start: 2023-08-08 | End: 2023-08-08

## 2023-08-08 RX ORDER — DIPHENHYDRAMINE HYDROCHLORIDE 50 MG/ML
12.5 INJECTION INTRAMUSCULAR; INTRAVENOUS AS NEEDED
Status: DISCONTINUED | OUTPATIENT
Start: 2023-08-08 | End: 2023-08-08 | Stop reason: HOSPADM

## 2023-08-08 RX ORDER — PANTOPRAZOLE SODIUM 40 MG/1
40 TABLET, DELAYED RELEASE ORAL
Status: DISCONTINUED | OUTPATIENT
Start: 2023-08-09 | End: 2023-08-14

## 2023-08-08 RX ORDER — ONDANSETRON 2 MG/ML
4 INJECTION INTRAMUSCULAR; INTRAVENOUS AS NEEDED
Status: DISCONTINUED | OUTPATIENT
Start: 2023-08-08 | End: 2023-08-08 | Stop reason: HOSPADM

## 2023-08-08 RX ORDER — NALOXONE HYDROCHLORIDE 0.4 MG/ML
80 INJECTION, SOLUTION INTRAMUSCULAR; INTRAVENOUS; SUBCUTANEOUS AS NEEDED
Status: DISCONTINUED | OUTPATIENT
Start: 2023-08-08 | End: 2023-08-08 | Stop reason: HOSPADM

## 2023-08-08 RX ORDER — LIDOCAINE HYDROCHLORIDE 10 MG/ML
INJECTION, SOLUTION EPIDURAL; INFILTRATION; INTRACAUDAL; PERINEURAL AS NEEDED
Status: DISCONTINUED | OUTPATIENT
Start: 2023-08-08 | End: 2023-08-08 | Stop reason: SURG

## 2023-08-08 RX ORDER — SODIUM CHLORIDE, SODIUM LACTATE, POTASSIUM CHLORIDE, CALCIUM CHLORIDE 600; 310; 30; 20 MG/100ML; MG/100ML; MG/100ML; MG/100ML
INJECTION, SOLUTION INTRAVENOUS CONTINUOUS
Status: DISCONTINUED | OUTPATIENT
Start: 2023-08-08 | End: 2023-08-14

## 2023-08-08 RX ORDER — HYDROMORPHONE HYDROCHLORIDE 1 MG/ML
0.4 INJECTION, SOLUTION INTRAMUSCULAR; INTRAVENOUS; SUBCUTANEOUS EVERY 5 MIN PRN
Status: DISCONTINUED | OUTPATIENT
Start: 2023-08-08 | End: 2023-08-08 | Stop reason: HOSPADM

## 2023-08-08 RX ORDER — DEXTROSE MONOHYDRATE 25 G/50ML
50 INJECTION, SOLUTION INTRAVENOUS
Status: DISCONTINUED | OUTPATIENT
Start: 2023-08-08 | End: 2023-08-08 | Stop reason: HOSPADM

## 2023-08-08 RX ORDER — METOCLOPRAMIDE HYDROCHLORIDE 5 MG/ML
10 INJECTION INTRAMUSCULAR; INTRAVENOUS AS NEEDED
Status: DISCONTINUED | OUTPATIENT
Start: 2023-08-08 | End: 2023-08-08 | Stop reason: HOSPADM

## 2023-08-08 RX ADMIN — LIDOCAINE HYDROCHLORIDE 50 MG: 10 INJECTION, SOLUTION EPIDURAL; INFILTRATION; INTRACAUDAL; PERINEURAL at 12:37:00

## 2023-08-08 NOTE — OPERATIVE REPORT
Flexible Sigmoidoscopy Operative Report      Hubert Cooper Patient Status:  Inpatient    1940 MRN [id number]   Location [id number] [place] [address] Attending Felisha Rooney MD   Hosp Day #   4 PCP Tasha Willis MD       Pre-Operative Diagnosis: Recurrent left-sided diverticular bleeding    Post-Operative Diagnosis:  - Small non-bleeding Grade 1 internal hemorrhoids.  - Extensive large and small diverticular burden throughout the sigmoid and descending colon. - [place]-appearing red blood + clots in the distal sigmoid colon within a segment of significant diverticular involvement. Multiple diverticula with inspissated stool + clots, but no active bleeding or culprit diverticulum identified.  - Darker red blood mixed with brown stool material (likely mixing with refluxed blood) throughout the proximal sigmoid colon + descending colon, with more solid obscuring stool at the splenic flexure which precludes further visualization. No evidence of overt active bleeding at the splenic flexure in setting of limited visualization. Bleeding does seem to be more distal in nature (most likely sigmoid at this time). Procedure Performed: FLEXIBLE SIGMOIDOSCOPY      Informed Consent: Informed consent for both the procedure and sedation were obtained from the patient. The potentially life-threatening complications of sedation, bleeding,  perforation, transfusion or repeat endoscopy  were reviewed along with the possible need for hospitalization, surgical management, transfusion or repeat endoscopy should one of these complications arise. The patient understands and is agreeable to proceed. Sedation Type: MAC. Patient received sedation with monitored anesthesia provided by an Anesthesiologist  Moderate Sedation Time: None. Deep sedation provided by anesthesia. Procedure Description: The patient was placed in the left lateral decubitus position. After careful digital rectal examination, the adult EGD scope was inserted into the rectum and advanced to the level of the splenic flexure under direct visualization. Careful examination was performed during withdrawal of the endoscope. The scope was withdrawn to the rectum and retroflexion was performed. The patient tolerated the procedure well with no immediate complications. The patient was transferred to the recovery area in stable condition. Quality of Preparation: Inadequate    [place] Bowel Prep Scale:  4    Complications:  No immediate complications noted. COL Findings:  - Small non-bleeding Grade 1 internal hemorrhoids.  - Extensive large and small diverticular burden throughout the sigmoid and descending colon. - [place]-appearing red blood + clots in the distal sigmoid colon within a segment of significant diverticular involvement. Multiple diverticula with inspissated stool + clots, but no active bleeding or culprit diverticulum identified.  - Darker red blood mixed with brown stool material (likely mixing with refluxed blood) throughout the proximal sigmoid colon + descending colon, with more solid obscuring stool at the splenic flexure which precludes further visualization. No evidence of overt active bleeding at the splenic flexure in setting of limited visualization. Bleeding does seem to be more distal in nature (most likely sigmoid at this time). Recommendations:  - Keep NPO for now. - Obtain CTA Abdomen/Pelvis now. - Endoscopic evaluation has not been able to localize a treatable diverticular source despite localizing study (positive tRBC scan 8/4) with subsequent COL (8/5) and now with urgent repeated FS in setting of brisk new bleeding this morning (8/8). - Advise IR + Surgery consultations for next steps in care, await CTA findings in the meantime.  - Findings were discussed with the patient and requested family/acquaintance today following procedure. Discharge:   The patient was given an after visit summary detailing the procedure, findings, recommendations and follow up plans.      Faviola Marie MD  8/8/2023  12:53 PM

## 2023-08-08 NOTE — ANESTHESIA POSTPROCEDURE EVALUATION
Everett Jeffrey Patient Status:  Inpatient   Age/Gender 79year old male MRN [id number]   Location [address] [address] Attending Sally Painter MD   [address] Day # 4 PCP Roldan Ortiz MD       Anesthesia Post-op Note    FLEXIBLE SIGMOIDOSCOPY    Procedure Summary       Date: 08/08/23 Room / Location: 140[facility] ENDOSCOPY 04 / 1404 [facility] ENDOSCOPY    Anesthesia Start: 3417 Anesthesia Stop:     Procedure: FLEXIBLE SIGMOIDOSCOPY Diagnosis: (Diverticular bleed)    Surgeons: Elinor Gilman MD Anesthesiologist: Alfredo Jacobson MD    Anesthesia Type: MAC ASA Status: 3            Anesthesia Type: MAC    Vitals Value Taken Time   BP 80/45 08/08/23 1256   Temp  08/08/23 1256   Pulse 74 08/08/23 1256   Resp 16 08/08/23 1256   SpO2 96 08/08/23 1256       Patient Location: Endoscopy    Anesthesia Type: MAC    Airway Patency: patent    Postop Pain Control: adequate    Mental Status: mildly sedated but able to meaningfully participate in the post-anesthesia evaluation    Nausea/Vomiting: none    Cardiopulmonary/Hydration status: stable euvolemic    Complications: no apparent anesthesia related complications    Postop vital signs: stable    Dental Exam: Unchanged from Preop    Patient to be discharged from PACU when criteria met.

## 2023-08-08 NOTE — PLAN OF CARE
Patient A&Ox4. Patient resting in bed, no apparent distress. Patient resting on RA. Patient remains on tele and  monitoring. No signs of bleeding. K+ replacedx1, recheck in am. IVF. See morning labs. Safety/fall precautions in place. Call light in reach. Patient did have bloody BM this am, Hospitalist and GI notified. Problem: Patient/Family Goals  Goal: Patient/Family Long Term Goal  Description: Patient's Long Term Goal: To have bleeding fully resolved and will have no recurrence. Interventions:  - home safety  - activity as directed  - monitoring of symptoms  - See additional Care Plan goals for specific interventions  Outcome: Progressing  Goal: Patient/Family Short Term Goal  Description: Patient's Short Term Goal: To have dizziness improve and able to get out of bed tomorrow. 8/6: safety, monitor Hgb, monitor for bleeding  8/7 noc: safety, monitor Hgb, monitor for bleeding  Interventions:   - CLD  - IVF  - serial Hg  - monitor for further or increase in rectal bleeding  - See additional Care Plan goals for specific interventions  Outcome: Progressing     Problem: SAFETY ADULT - FALL  Goal: Free from fall injury  Description: INTERVENTIONS:  - Assess pt frequently for physical needs  - Identify cognitive and physical deficits and behaviors that affect risk of falls.   - [place] fall precautions as indicated by assessment.  - Educate pt/family on patient safety including physical limitations  - Instruct pt to call for assistance with activity based on assessment  - Modify environment to reduce risk of injury  - Provide assistive devices as appropriate  - Consider OT/PT consult to assist with strengthening/mobility  - Encourage toileting schedule  Outcome: Progressing     Problem: GASTROINTESTINAL - ADULT  Goal: Maintains or returns to baseline bowel function  Description: INTERVENTIONS:  - Assess bowel function  - Maintain adequate hydration with IV or PO as ordered and tolerated  - Evaluate effectiveness of GI medications  - Encourage mobilization and activity  - Obtain nutritional consult as needed  - Establish a toileting routine/schedule  - Consider collaborating with pharmacy to review patient's medication profile  Outcome: Progressing

## 2023-08-08 NOTE — H&P
History and Physical for Endoscopic Procedure      Holden Weaver Patient Status:  Inpatient    1940 MRN [id number]   Location [address] [address] Attending Yris Vásquez MD   [facility] Day # 4 PCP Ivan Leahy MD     Date of Consult:  2023     Reason for Consultation:  Recurrent diverticular bleeding    History of Present Illness:  Holden Weaver is a a(n) 79year old male. History:  Past Medical History:   Diagnosis Date    ANEMIA     Gastritis 2010    H/O [] diverticulosis with hemorrhage [] recheck 10 years - ARASELI Tavarez 2011    HYPERLIPIDEMIA     Increased glucose level 2010    Lower GI bleed 2010    Mass of finger of right hand 2018    OSTEOARTHRITIS     Osteoarthritis of Rt hip 2016    Other biomechanical lesions of lower extremity 1/15/2018    Patellar tendonitis of right knee 2017    S/P hernia repair 2010    S/P [ ' 03 ] Lt total hip replacement - WMeridefelicia Saas 2017    S/P [1207] Rt total knee replacement - CYDNEY Mortenseno 2017    S/P [607] repair of ventral hernia - DEBBY Bean 2010    S/P [] Lt total knee replacement 2017    Trigger finger of right thumb - MADY Chacko Karime 10/4/2015     Past Surgical History:   Procedure Laterality Date    COLONOSCOPY      COLONOSCOPY N/A 2023    Procedure: COLONOSCOPY;  Surgeon: Helena Bass MD;  Location: [address] [place] [address] ENDOSCOPY    HERNIA SURGERY      KNEE REPLACEMENT SURGERY      Bilateral knee replacement     Family History   Problem Relation Age of Onset    Cancer Mother       reports that he has quit smoking. He has never used smokeless tobacco. He reports that he does not drink alcohol and does not use drugs.     Allergies:  No Known Allergies    Medications:    Current Facility-Administered Medications:     glucose (Dex4) 15 GM/59ML oral liquid 15 g, 15 g, Oral, Q15 Min PRN **OR** glucose (Glutose) 40% oral gel 15 g, 15 g, Oral, Q15 Min PRN **OR** glucose-vitamin C (Dex-4) chewable tab 4 tablet, 4 tablet, Oral, Q15 Min PRN **OR** dextrose 50% injection 50 mL, 50 mL, Intravenous, Q15 Min PRN **OR** glucose (Dex4) 15 GM/59ML oral liquid 30 g, 30 g, Oral, Q15 Min PRN **OR** glucose (Glutose) 40% oral gel 30 g, 30 g, Oral, Q15 Min PRN **OR** glucose-vitamin C (Dex-4) chewable tab 8 tablet, 8 tablet, Oral, Q15 Min PRN    lactated ringers infusion, , Intravenous, Continuous    ondansetron (Zofran) 4 MG/2ML injection 4 mg, 4 mg, Intravenous, PRN    metoclopramide (Reglan) 5 mg/mL injection 10 mg, 10 mg, Intravenous, PRN    HYDROmorphone (Dilaudid) 1 MG/ML injection 0.4 mg, 0.4 mg, Intravenous, Q5 Min PRN    diphenhydrAMINE (Benadryl) 50 mg/mL  injection 12.5 mg, 12.5 mg, Intravenous, PRN    atropine 0.1 MG/ML injection 0.5 mg, 0.5 mg, Intravenous, PRN    naloxone (Narcan) 0.4 MG/ML injection 80 mcg, 80 mcg, Intravenous, PRN    acetaminophen (Tylenol Extra Strength) tab 500 mg, 500 mg, Oral, Q4H PRN    melatonin tab 3 mg, 3 mg, Oral, Nightly PRN    polyethylene glycol (PEG 3350) (Miralax) 17 g oral packet 17 g, 17 g, Oral, Daily PRN    sennosides (Senokot) tab 17.2 mg, 17.2 mg, Oral, Nightly PRN    ondansetron (Zofran) 4 MG/2ML injection 4 mg, 4 mg, Intravenous, Q6H PRN    metoclopramide (Reglan) 5 mg/mL injection 10 mg, 10 mg, Intravenous, Q8H PRN    guaiFENesin (Robitussin) 100 MG/5 ML oral liquid 200 mg, 200 mg, Oral, Q4H PRN    sodium chloride 0.9% infusion, , Intravenous, Continuous    tamsulosin (Flomax) cap 0.4 mg, 0.4 mg, Oral, Daily @ 0700    Facility-Administered Medications Ordered in Other Encounters:     lidocaine PF (Xylocaine-MPF) 1% injection, , Intravenous, PRN    propofol (Diprivan) 10 MG/ML injection, , Intravenous, PRN    propofol (Diprivan) 10 mg/mL infusion premix, , Intravenous, Continuous PRN    Review of Systems:  Gastrointestinal: negative other than specified in the HPI  General: negative other than specified in the HPI  Neurological: negative other than specified in the HPI  Cardiovascular: negative other than specified in the HPI  Respiratory: negative other than specified in the HPI    Physical Exam:  No acute distress  RRR  CTA B/L  SOFT +BS    Assessment/Plan:  Patient Active Problem List:     Gastritis, erosive / Dx by EGD [5/07] Rx PPI [Omeprazoloe] - YASMANY Myers     Increased Glucose Level / HbA1C ~ 5.7%     Hyperlipidemia, mixed / Rx: Atorvastatin / LDL Goal < 100 mg/dl     Varicose veins of both lower extremities with pain     Obesity, morbid (HCC)     Gastrointestinal hemorrhage, unspecified gastrointestinal hemorrhage type     GIB (gastrointestinal bleeding)      FS today    Dangelo Mcnamara MD  8/8/2023  12:52 PM

## 2023-08-08 NOTE — PLAN OF CARE
Patient is A+Ox4, glasses. Maintaining sats >90% on RA. NSR on tele, no anticoagulants. + orthostatics. Bloody stools w/ clots throughout shift. MD notified, 1 unit of PRBC's given, Flex sig. NPO. Up 1 w/ walker. Patient updated on POC, Awaiting CT abd. Per GI. Problem: Patient/Family Goals  Goal: Patient/Family Long Term Goal  Description: Patient's Long Term Goal: To have bleeding fully resolved and will have no recurrence. Interventions:  - home safety  - activity as directed  - monitoring of symptoms  - See additional Care Plan goals for specific interventions  Outcome: Progressing  Goal: Patient/Family Short Term Goal  Description: Patient's Short Term Goal: To have dizziness improve and able to get out of bed tomorrow. 8/6: safety, monitor Hgb, monitor for bleeding  8/7 noc: safety, monitor Hgb, monitor for bleeding  8/8 AM: control bleeding  Interventions:   - CLD  - IVF  - serial Hg  - monitor for further or increase in rectal bleeding  - See additional Care Plan goals for specific interventions  Outcome: Progressing     Problem: SAFETY ADULT - FALL  Goal: Free from fall injury  Description: INTERVENTIONS:  - Assess pt frequently for physical needs  - Identify cognitive and physical deficits and behaviors that affect risk of falls.   - [place] fall precautions as indicated by assessment.  - Educate pt/family on patient safety including physical limitations  - Instruct pt to call for assistance with activity based on assessment  - Modify environment to reduce risk of injury  - Provide assistive devices as appropriate  - Consider OT/PT consult to assist with strengthening/mobility  - Encourage toileting schedule  Outcome: Progressing     Problem: GASTROINTESTINAL - ADULT  Goal: Maintains or returns to baseline bowel function  Description: INTERVENTIONS:  - Assess bowel function  - Maintain adequate hydration with IV or PO as ordered and tolerated  - Evaluate effectiveness of GI medications  - Encourage mobilization and activity  - Obtain nutritional consult as needed  - Establish a toileting routine/schedule  - Consider collaborating with pharmacy to review patient's medication profile  Outcome: Progressing

## 2023-08-09 LAB
ANION GAP SERPL CALC-SCNC: 4 MMOL/L (ref 0–18)
BASOPHILS # BLD AUTO: 0.02 X10(3) UL (ref 0–0.2)
BASOPHILS NFR BLD AUTO: 0.3 %
BUN BLD-MCNC: 8 MG/DL (ref 7–18)
CALCIUM BLD-MCNC: 8.3 MG/DL (ref 8.5–10.1)
CHLORIDE SERPL-SCNC: 112 MMOL/L (ref 98–112)
CO2 SERPL-SCNC: 26 MMOL/L (ref 21–32)
CREAT BLD-MCNC: 0.56 MG/DL
EGFRCR SERPLBLD CKD-EPI 2021: 98 ML/MIN/1.73M2 (ref 60–?)
EOSINOPHIL # BLD AUTO: 0.18 X10(3) UL (ref 0–0.7)
EOSINOPHIL NFR BLD AUTO: 3 %
ERYTHROCYTE [DISTWIDTH] IN BLOOD BY AUTOMATED COUNT: 17.8 %
GLUCOSE BLD-MCNC: 98 MG/DL (ref 70–99)
HCT VFR BLD AUTO: 25.7 %
HGB BLD-MCNC: 8.3 G/DL
IMM GRANULOCYTES # BLD AUTO: 0.04 X10(3) UL (ref 0–1)
IMM GRANULOCYTES NFR BLD: 0.7 %
LYMPHOCYTES # BLD AUTO: 0.69 X10(3) UL (ref 1–4)
LYMPHOCYTES NFR BLD AUTO: 11.6 %
MAGNESIUM SERPL-MCNC: 2.2 MG/DL (ref 1.6–2.6)
MCH RBC QN AUTO: 29 PG (ref 26–34)
MCHC RBC AUTO-ENTMCNC: 32.3 G/DL (ref 31–37)
MCV RBC AUTO: 89.9 FL
MONOCYTES # BLD AUTO: 0.4 X10(3) UL (ref 0.1–1)
MONOCYTES NFR BLD AUTO: 6.7 %
NEUTROPHILS # BLD AUTO: 4.61 X10 (3) UL (ref 1.5–7.7)
NEUTROPHILS # BLD AUTO: 4.61 X10(3) UL (ref 1.5–7.7)
NEUTROPHILS NFR BLD AUTO: 77.7 %
OSMOLALITY SERPL CALC.SUM OF ELEC: 292 MOSM/KG (ref 275–295)
PLATELET # BLD AUTO: 246 10(3)UL (ref 150–450)
POTASSIUM SERPL-SCNC: 4 MMOL/L (ref 3.5–5.1)
POTASSIUM SERPL-SCNC: 4 MMOL/L (ref 3.5–5.1)
RBC # BLD AUTO: 2.86 X10(6)UL
SODIUM SERPL-SCNC: 142 MMOL/L (ref 136–145)
WBC # BLD AUTO: 5.9 X10(3) UL (ref 4–11)

## 2023-08-09 NOTE — PLAN OF CARE
Patient A&Ox4. Patient resting in bed, no apparent distress. Patient resting on RA. Patient remains on tele and  monitoring. No signs of bleeding. K+ replacedx1, recheck in am. See morning labs. Safety/fall precautions in place. Call light in reach. Problem: Patient/Family Goals  Goal: Patient/Family Long Term Goal  Description: Patient's Long Term Goal: To have bleeding fully resolved and will have no recurrence. Interventions:  - home safety  - activity as directed  - monitoring of symptoms  - See additional Care Plan goals for specific interventions  Outcome: Progressing  Goal: Patient/Family Short Term Goal  Description: Patient's Short Term Goal: To have dizziness improve and able to get out of bed tomorrow. 8/6: safety, monitor Hgb, monitor for bleeding  8/7 noc: safety, monitor Hgb, monitor for bleeding  8/8 AM: control bleeding  8/8 noc: monitor bleeding, monitor Hgb  Interventions:   - CLD  - IVF  - serial Hg  - monitor for further or increase in rectal bleeding  - See additional Care Plan goals for specific interventions  Outcome: Progressing     Problem: SAFETY ADULT - FALL  Goal: Free from fall injury  Description: INTERVENTIONS:  - Assess pt frequently for physical needs  - Identify cognitive and physical deficits and behaviors that affect risk of falls.   - [place] fall precautions as indicated by assessment.  - Educate pt/family on patient safety including physical limitations  - Instruct pt to call for assistance with activity based on assessment  - Modify environment to reduce risk of injury  - Provide assistive devices as appropriate  - Consider OT/PT consult to assist with strengthening/mobility  - Encourage toileting schedule  Outcome: Progressing     Problem: GASTROINTESTINAL - ADULT  Goal: Maintains or returns to baseline bowel function  Description: INTERVENTIONS:  - Assess bowel function  - Maintain adequate hydration with IV or PO as ordered and tolerated  - Evaluate effectiveness of GI medications  - Encourage mobilization and activity  - Obtain nutritional consult as needed  - Establish a toileting routine/schedule  - Consider collaborating with pharmacy to review patient's medication profile  Outcome: Progressing

## 2023-08-09 NOTE — PLAN OF CARE
Patient is A+Ox4, glasses. Maintaining sats >90% on RA. NSR on tele, no anticoagulants. + orthostatics. No BM. Clear liquid diet, advance as tolerated. Up 1 w/ walker. Patient updated on POC, monitor for bleeding/ advance diet. Problem: Patient/Family Goals  Goal: Patient/Family Long Term Goal  Description: Patient's Long Term Goal: To have bleeding fully resolved and will have no recurrence. Interventions:  - home safety  - activity as directed  - monitoring of symptoms  - See additional Care Plan goals for specific interventions  Outcome: Progressing  Goal: Patient/Family Short Term Goal  Description: Patient's Short Term Goal: To have dizziness improve and able to get out of bed tomorrow. 8/6: safety, monitor Hgb, monitor for bleeding  8/7 noc: safety, monitor Hgb, monitor for bleeding  8/8 AM: control bleeding  8/8 noc: monitor bleeding, monitor Hgb  8/9 AM: monitor bleeding  Interventions:   - CLD  - IVF  - serial Hg  - monitor for further or increase in rectal bleeding  - See additional Care Plan goals for specific interventions  Outcome: Progressing     Problem: SAFETY ADULT - FALL  Goal: Free from fall injury  Description: INTERVENTIONS:  - Assess pt frequently for physical needs  - Identify cognitive and physical deficits and behaviors that affect risk of falls.   - [place] fall precautions as indicated by assessment.  - Educate pt/family on patient safety including physical limitations  - Instruct pt to call for assistance with activity based on assessment  - Modify environment to reduce risk of injury  - Provide assistive devices as appropriate  - Consider OT/PT consult to assist with strengthening/mobility  - Encourage toileting schedule  Outcome: Progressing     Problem: GASTROINTESTINAL - ADULT  Goal: Maintains or returns to baseline bowel function  Description: INTERVENTIONS:  - Assess bowel function  - Maintain adequate hydration with IV or PO as ordered and tolerated  - Evaluate effectiveness of GI medications  - Encourage mobilization and activity  - Obtain nutritional consult as needed  - Establish a toileting routine/schedule  - Consider collaborating with pharmacy to review patient's medication profile  Outcome: Progressing

## 2023-08-10 LAB
ANION GAP SERPL CALC-SCNC: 5 MMOL/L (ref 0–18)
BASOPHILS # BLD AUTO: 0.01 X10(3) UL (ref 0–0.2)
BASOPHILS NFR BLD AUTO: 0.2 %
BUN BLD-MCNC: 8 MG/DL (ref 7–18)
CALCIUM BLD-MCNC: 8.8 MG/DL (ref 8.5–10.1)
CHLORIDE SERPL-SCNC: 110 MMOL/L (ref 98–112)
CO2 SERPL-SCNC: 26 MMOL/L (ref 21–32)
CREAT BLD-MCNC: 0.56 MG/DL
EGFRCR SERPLBLD CKD-EPI 2021: 98 ML/MIN/1.73M2 (ref 60–?)
EOSINOPHIL # BLD AUTO: 0.21 X10(3) UL (ref 0–0.7)
EOSINOPHIL NFR BLD AUTO: 3.8 %
ERYTHROCYTE [DISTWIDTH] IN BLOOD BY AUTOMATED COUNT: 18.1 %
GLUCOSE BLD-MCNC: 104 MG/DL (ref 70–99)
HCT VFR BLD AUTO: 27 %
HGB BLD-MCNC: 8.7 G/DL
IMM GRANULOCYTES # BLD AUTO: 0.02 X10(3) UL (ref 0–1)
IMM GRANULOCYTES NFR BLD: 0.4 %
LYMPHOCYTES # BLD AUTO: 0.81 X10(3) UL (ref 1–4)
LYMPHOCYTES NFR BLD AUTO: 14.8 %
MAGNESIUM SERPL-MCNC: 2.3 MG/DL (ref 1.6–2.6)
MCH RBC QN AUTO: 28.9 PG (ref 26–34)
MCHC RBC AUTO-ENTMCNC: 32.2 G/DL (ref 31–37)
MCV RBC AUTO: 89.7 FL
MONOCYTES # BLD AUTO: 0.34 X10(3) UL (ref 0.1–1)
MONOCYTES NFR BLD AUTO: 6.2 %
NEUTROPHILS # BLD AUTO: 4.09 X10 (3) UL (ref 1.5–7.7)
NEUTROPHILS # BLD AUTO: 4.09 X10(3) UL (ref 1.5–7.7)
NEUTROPHILS NFR BLD AUTO: 74.6 %
OSMOLALITY SERPL CALC.SUM OF ELEC: 291 MOSM/KG (ref 275–295)
PLATELET # BLD AUTO: 275 10(3)UL (ref 150–450)
POTASSIUM SERPL-SCNC: 3.9 MMOL/L (ref 3.5–5.1)
RBC # BLD AUTO: 3.01 X10(6)UL
SODIUM SERPL-SCNC: 141 MMOL/L (ref 136–145)
WBC # BLD AUTO: 5.5 X10(3) UL (ref 4–11)

## 2023-08-10 NOTE — PLAN OF CARE
Assumed care at [address] [place] [address], will advance diet. No bleeding noted  Will check labs in am  Discharge planning home  Vital signs stable  Problem: Patient/Family Goals  Goal: Patient/Family Long Term Goal  Description: Patient's Long Term Goal: To have bleeding fully resolved and will have no recurrence. Interventions:  - home safety  - activity as directed  - monitoring of symptoms  - See additional Care Plan goals for specific interventions  Outcome: Progressing  Goal: Patient/Family Short Term Goal  Description: Patient's Short Term Goal: To have dizziness improve and able to get out of bed tomorrow. 8/6: safety, monitor Hgb, monitor for bleeding  8/7 noc: safety, monitor Hgb, monitor for bleeding  8/8 AM: control bleeding  8/8 noc: monitor bleeding, monitor Hgb  8/9 AM: monitor bleeding  8/9:Noc:monitor bleed  Interventions:   - advance diet  - IVF  - serial Hg  - monitor for further or increase in rectal bleeding  - See additional Care Plan goals for specific interventions  Outcome: Progressing     Problem: SAFETY ADULT - FALL  Goal: Free from fall injury  Description: INTERVENTIONS:  - Assess pt frequently for physical needs  - Identify cognitive and physical deficits and behaviors that affect risk of falls.   - [place] fall precautions as indicated by assessment.  - Educate pt/family on patient safety including physical limitations  - Instruct pt to call for assistance with activity based on assessment  - Modify environment to reduce risk of injury  - Provide assistive devices as appropriate  - Consider OT/PT consult to assist with strengthening/mobility  - Encourage toileting schedule  Outcome: Progressing     Problem: GASTROINTESTINAL - ADULT  Goal: Maintains or returns to baseline bowel function  Description: INTERVENTIONS:  - Assess bowel function  - Maintain adequate hydration with IV or PO as ordered and tolerated  - Evaluate effectiveness of GI medications  - Encourage mobilization and activity  - Obtain nutritional consult as needed  - Establish a toileting routine/schedule  - Consider collaborating with pharmacy to review patient's medication profile  Outcome: Progressing     Problem: HEMATOLOGIC - ADULT  Goal: Maintains hematologic stability  Description: INTERVENTIONS  - Assess for signs and symptoms of bleeding or hemorrhage  - Monitor labs and vital signs for trends  - Administer supportive blood products/factors, fluids and medications as ordered and appropriate  - Administer supportive blood products/factors as ordered and appropriate  Outcome: Progressing

## 2023-08-10 NOTE — PHYSICAL THERAPY NOTE
PHYSICAL THERAPY TREATMENT NOTE - INPATIENT    Room Number: 529/529-A     Session: 2    Number of Visits to Meet Established Goals: 5    Presenting Problem: rectal bleeding  Co-Morbidities : H/o gastritis, LGIB, HL, lymphedema, L CARMEN, B TKA (working w/ wt loss clinic to get ready for right hip replacement); reports recent onset of vertigo 3 months ago'    History related to current admission: Patient is a 79year old male admitted on 2023 from home for rectal bleeding. Pt diagnosed with splenic rupture. COLONOSCOPY COMPLETED : Numerous diverticulum transverse, descending, sigmoid colon. Old blood and clots lavaged. Clots and fibrous debris in rectum unable to suction. Second look at transverse and left colon. No active bleed or visible vessel. Normal terminal ileum, no clots or old blood right colon or transverse colon suggesting resolved diverticular bleed of left colon     Imaging: NM GI Blood Loss: Abnormal uptake within the splenic flexure consistent with active bleeding. Correlate clinically. RRT called  due to splenic rupture     Received PRBC unit on  due to low HgB (/)    ASSESSMENT     Pt seen this AM for attempted tranfsers/gait training - RN requested orthostatics. Seated: 90/48 (mild dizziness - requesting to push through)  Standin/33 (dizzy, woozy, blurry vision)  Return to supine: 120/99    At this time, Pt. presents with decreased balance, impaired strength, difficulty with gait/transfers resulting in downgrade of overall functional mobility. Due to above deficits, Pt will benefit from continued IP PT, so that patient may achieve highest functional independence/return to baseline. DISCHARGE RECOMMENDATIONS  PT Discharge Recommendations: Outpatient PT     PLAN  PT Treatment Plan: Bed mobility; Body mechanics; Endurance; Patient education; Energy conservation; Family education;Gait training;Neuromuscular re-educate;Range of motion;Strengthening;Stoop training;Stair training;Transfer training;Balance training  Rehab Potential : Good  Frequency (Obs): 3-5x/week    CURRENT GOALS     Goal #1 Patient is able to demonstrate supine - sit EOB @ level: modified independent      Goal #2 Patient is able to demonstrate transfers Sit to/from Stand at assistance level: modified independent      Goal #3 Patient is able to ambulate 50 feet with assist device: walker - rolling at assistance level: supervision      Goal #4 Pt able to ascend/descend 7 steps with supervision for home navigation. Goal #5     Goal #6     Goal Comments: Goals established on 8/5/2023 8/7/2023 all goals ongoing      SUBJECTIVE  \"I actually feel the best right now since I have been here. \"    OBJECTIVE  Precautions: Bed/chair alarm (orthostatic BP)    WEIGHT BEARING RESTRICTION  Weight Bearing Restriction: None                PAIN ASSESSMENT   Rating: Unable to rate  Location: chronic R hip - audible crepitis  Management Techniques: Body mechanics; Activity promotion;Repositioning    BALANCE                                                                                                                       Static Sitting: Fair  Dynamic Sitting: Fair -           Static Standing: Fair -  Dynamic Standing: Poor +    ACTIVITY TOLERANCE                         O2 WALK         AM-PAC '6-Clicks' INPATIENT SHORT FORM - BASIC MOBILITY  How much difficulty does the patient currently have. .. Patient Difficulty: Turning over in bed (including adjusting bedclothes, sheets and blankets)?: A Little   Patient Difficulty: Sitting down on and standing up from a chair with arms (e.g., wheelchair, bedside commode, etc.): A Little   Patient Difficulty: Moving from lying on back to sitting on the side of the bed?: A Lot   How much help from another person does the patient currently need. ..    Help from Another: Moving to and from a bed to a chair (including a wheelchair)?: A Little   Help from Another: Need to walk in hospital room?: A Little   Help from Another: Climbing 3-5 steps with a railing?: A Lot       AM-PAC Score:  Raw Score: 16   Approx Degree of Impairment: 54.16%   Standardized Score (AM-PAC Scale): 40.78   CMS Modifier (G-Code): CK    FUNCTIONAL ABILITY STATUS  Gait Assessment   Functional Mobility/Gait Assessment  Gait Assistance: Not tested  Distance (ft): 0    Skilled Therapy Provided    Bed Mobility:  Rolling: NT   Supine<>Sit: CGA (HOB elevated)   Sit<>Supine: [place] - assist to bring LE back onto bed. Pt typically sleeps in recliner chair so not typically a barrier he runs into. Transfer Mobility:  Sit<>Stand: SBA (extra time required)    Stand<>Sit: SBA    Gait: NT    Therapist's Comments: Pt remains orthostatic - unsafe to attempt transfer to chair or gait training. Pt remains highly motivated, and upset by blood pressure readings. Emotional support provided      THERAPEUTIC EXERCISES  Lower Extremity Alternating marching  Heel raises  Heel slides  LAQ  Toe raises     Upper Extremity Elbow flex/ext and  - open/close     Position Sitting     Repetitions   10   Sets   1-2     Patient End of Session: In bed;Needs met;Call light within reach;RN aware of session/findings; All patient questions and concerns addressed    PT Session Time: 45 minutes  Gait Training:  minutes  Therapeutic Activity: 30 minutes  Therapeutic Exercise: 15 minutes   Neuromuscular Re-education:  minutes

## 2023-08-11 LAB
ANION GAP SERPL CALC-SCNC: 8 MMOL/L (ref 0–18)
BASOPHILS # BLD AUTO: 0.01 X10(3) UL (ref 0–0.2)
BASOPHILS NFR BLD AUTO: 0.2 %
BLOOD TYPE BARCODE: 5100
BUN BLD-MCNC: 12 MG/DL (ref 7–18)
CALCIUM BLD-MCNC: 8.3 MG/DL (ref 8.5–10.1)
CHLORIDE SERPL-SCNC: 110 MMOL/L (ref 98–112)
CO2 SERPL-SCNC: 23 MMOL/L (ref 21–32)
CREAT BLD-MCNC: 0.71 MG/DL
EGFRCR SERPLBLD CKD-EPI 2021: 91 ML/MIN/1.73M2 (ref 60–?)
EOSINOPHIL # BLD AUTO: 0.14 X10(3) UL (ref 0–0.7)
EOSINOPHIL NFR BLD AUTO: 2.5 %
ERYTHROCYTE [DISTWIDTH] IN BLOOD BY AUTOMATED COUNT: 17.7 %
GLUCOSE BLD-MCNC: 139 MG/DL (ref 70–99)
HCT VFR BLD AUTO: 26.5 %
HGB BLD-MCNC: 8.4 G/DL
IMM GRANULOCYTES # BLD AUTO: 0 X10(3) UL (ref 0–1)
IMM GRANULOCYTES NFR BLD: 0 %
LYMPHOCYTES # BLD AUTO: 0.62 X10(3) UL (ref 1–4)
LYMPHOCYTES NFR BLD AUTO: 11 %
MCH RBC QN AUTO: 29.1 PG (ref 26–34)
MCHC RBC AUTO-ENTMCNC: 31.7 G/DL (ref 31–37)
MCV RBC AUTO: 91.7 FL
MONOCYTES # BLD AUTO: 0.26 X10(3) UL (ref 0.1–1)
MONOCYTES NFR BLD AUTO: 4.6 %
NEUTROPHILS # BLD AUTO: 4.61 X10 (3) UL (ref 1.5–7.7)
NEUTROPHILS # BLD AUTO: 4.61 X10(3) UL (ref 1.5–7.7)
NEUTROPHILS NFR BLD AUTO: 81.7 %
OSMOLALITY SERPL CALC.SUM OF ELEC: 294 MOSM/KG (ref 275–295)
PLATELET # BLD AUTO: 269 10(3)UL (ref 150–450)
POTASSIUM SERPL-SCNC: 3.8 MMOL/L (ref 3.5–5.1)
RBC # BLD AUTO: 2.89 X10(6)UL
SODIUM SERPL-SCNC: 141 MMOL/L (ref 136–145)
UNIT VOLUME: 350 ML
WBC # BLD AUTO: 5.6 X10(3) UL (ref 4–11)

## 2023-08-11 RX ORDER — PANTOPRAZOLE SODIUM 40 MG/1
40 TABLET, DELAYED RELEASE ORAL
Qty: 30 TABLET | Refills: 0 | Status: SHIPPED | OUTPATIENT
Start: 2023-08-12

## 2023-08-11 NOTE — PLAN OF CARE
Received patient on room air, saturating well. VSS. Orthostatic vitals still positive, patient complaining of dizziness. MD aware. Patient is resting in bed, at lowest position with bed rails up and call light within reach.

## 2023-08-11 NOTE — PLAN OF CARE
Pt A&OX4. On RA sating WNL. . On tele. NSR. No c/o pain at this time. Orthos qshift. Continent. Urinal at bedside. Up x1 with walker. Pt updated on plan of care and verbalized understanding. Call light within reach. All needs met at this time. Safety precautions in place. Will follow. Problem: Patient/Family Goals  Goal: Patient/Family Long Term Goal  Description: Patient's Long Term Goal: To have bleeding fully resolved and will have no recurrence. Interventions:  - home safety  - activity as directed  - monitoring of symptoms  - See additional Care Plan goals for specific interventions  Outcome: Progressing  Goal: Patient/Family Short Term Goal  Description: Patient's Short Term Goal: To have dizziness improve and able to get out of bed tomorrow. 8/6: safety, monitor Hgb, monitor for bleeding  8/7 noc: safety, monitor Hgb, monitor for bleeding  8/8 AM: control bleeding  8/8 noc: monitor bleeding, monitor Hgb  8/9 AM: monitor bleeding  8/10 NOC: Monitor for bleeding  Interventions:   - CLD  - IVF  - serial Hg  - monitor for further or increase in rectal bleeding  - See additional Care Plan goals for specific interventions  Outcome: Progressing     Problem: SAFETY ADULT - FALL  Goal: Free from fall injury  Description: INTERVENTIONS:  - Assess pt frequently for physical needs  - Identify cognitive and physical deficits and behaviors that affect risk of falls.   - [place] fall precautions as indicated by assessment.  - Educate pt/family on patient safety including physical limitations  - Instruct pt to call for assistance with activity based on assessment  - Modify environment to reduce risk of injury  - Provide assistive devices as appropriate  - Consider OT/PT consult to assist with strengthening/mobility  - Encourage toileting schedule  Outcome: Progressing     Problem: GASTROINTESTINAL - ADULT  Goal: Maintains or returns to baseline bowel function  Description: INTERVENTIONS:  - Assess bowel function  - Maintain adequate hydration with IV or PO as ordered and tolerated  - Evaluate effectiveness of GI medications  - Encourage mobilization and activity  - Obtain nutritional consult as needed  - Establish a toileting routine/schedule  - Consider collaborating with pharmacy to review patient's medication profile  Outcome: Progressing     Problem: HEMATOLOGIC - ADULT  Goal: Maintains hematologic stability  Description: INTERVENTIONS  - Assess for signs and symptoms of bleeding or hemorrhage  - Monitor labs and vital signs for trends  - Administer supportive blood products/factors, fluids and medications as ordered and appropriate  - Administer supportive blood products/factors as ordered and appropriate  Outcome: Progressing

## 2023-08-12 LAB
ANION GAP SERPL CALC-SCNC: 5 MMOL/L (ref 0–18)
BASOPHILS # BLD AUTO: 0.02 X10(3) UL (ref 0–0.2)
BASOPHILS NFR BLD AUTO: 0.3 %
BUN BLD-MCNC: 15 MG/DL (ref 7–18)
CALCIUM BLD-MCNC: 8.1 MG/DL (ref 8.5–10.1)
CHLORIDE SERPL-SCNC: 111 MMOL/L (ref 98–112)
CO2 SERPL-SCNC: 26 MMOL/L (ref 21–32)
CREAT BLD-MCNC: 0.72 MG/DL
EGFRCR SERPLBLD CKD-EPI 2021: 91 ML/MIN/1.73M2 (ref 60–?)
EOSINOPHIL # BLD AUTO: 0.25 X10(3) UL (ref 0–0.7)
EOSINOPHIL NFR BLD AUTO: 4 %
ERYTHROCYTE [DISTWIDTH] IN BLOOD BY AUTOMATED COUNT: 18.2 %
GLUCOSE BLD-MCNC: 105 MG/DL (ref 70–99)
HCT VFR BLD AUTO: 25.1 %
HGB BLD-MCNC: 8.1 G/DL
IMM GRANULOCYTES # BLD AUTO: 0.01 X10(3) UL (ref 0–1)
IMM GRANULOCYTES NFR BLD: 0.2 %
LYMPHOCYTES # BLD AUTO: 0.84 X10(3) UL (ref 1–4)
LYMPHOCYTES NFR BLD AUTO: 13.6 %
MCH RBC QN AUTO: 29.5 PG (ref 26–34)
MCHC RBC AUTO-ENTMCNC: 32.3 G/DL (ref 31–37)
MCV RBC AUTO: 91.3 FL
MONOCYTES # BLD AUTO: 0.48 X10(3) UL (ref 0.1–1)
MONOCYTES NFR BLD AUTO: 7.8 %
NEUTROPHILS # BLD AUTO: 4.59 X10 (3) UL (ref 1.5–7.7)
NEUTROPHILS # BLD AUTO: 4.59 X10(3) UL (ref 1.5–7.7)
NEUTROPHILS NFR BLD AUTO: 74.1 %
OSMOLALITY SERPL CALC.SUM OF ELEC: 295 MOSM/KG (ref 275–295)
PLATELET # BLD AUTO: 266 10(3)UL (ref 150–450)
POTASSIUM SERPL-SCNC: 4 MMOL/L (ref 3.5–5.1)
RBC # BLD AUTO: 2.75 X10(6)UL
SODIUM SERPL-SCNC: 142 MMOL/L (ref 136–145)
WBC # BLD AUTO: 6.2 X10(3) UL (ref 4–11)

## 2023-08-12 NOTE — PROGRESS NOTES
Pt reported of getting dizzy during checking of orthostatic BP, from lying to sitting and to standing. BP as ff vaygm=445/70, sitting 112/64, standing 110/70. Dr. Ratna Kumar paged and notified. MD with orders for  ml IV bolus x1 and to check CBC in am. Pt informed of plan of care.

## 2023-08-12 NOTE — PLAN OF CARE
Problem: Patient/Family Goals  Goal: Patient/Family Short Term Goal  Description: Patient's Short Term Goal: To have dizziness improve and able to get out of bed tomorrow.   8/6: safety, monitor Hgb, monitor for bleeding  8/7 noc: safety, monitor Hgb, monitor for bleeding  8/8 AM: control bleeding  8/8 noc: monitor bleeding, monitor Hgb  8/9 AM: monitor bleeding  8/10 NOC: Monitor for bleeding  8/11 NOC: sleep, monitor for bleeding  8/12 STABLE HGB, NO BLEEDING  Interventions:   - CLD  - IVF  - serial Hg  - monitor for further or increase in rectal bleeding  - See additional Care Plan goals for specific interventions  Outcome: Progressing     Problem: GASTROINTESTINAL - ADULT  Goal: Maintains or returns to baseline bowel function  Description: INTERVENTIONS:  - Assess bowel function  - Maintain adequate hydration with IV or PO as ordered and tolerated  - Evaluate effectiveness of GI medications  - Encourage mobilization and activity  - Obtain nutritional consult as needed  - Establish a toileting routine/schedule  - Consider collaborating with pharmacy to review patient's medication profile  Outcome: Progressing     Problem: HEMATOLOGIC - ADULT  Goal: Maintains hematologic stability  Description: INTERVENTIONS  - Assess for signs and symptoms of bleeding or hemorrhage  - Monitor labs and vital signs for trends  - Administer supportive blood products/factors, fluids and medications as ordered and appropriate  - Administer supportive blood products/factors as ordered and appropriate  Outcome: Progressing

## 2023-08-12 NOTE — PLAN OF CARE
Pt A&OX4. On RA sating WNL. . On tele. NSR. No c/o pain at this time. Orthos qshift. Continent. Urinal at bedside. Up x1 with walker. Pt updated on plan of care and verbalized understanding. Call light within reach. All needs met at this time. Safety precautions in place. Will follow. Problem: Patient/Family Goals  Goal: Patient/Family Long Term Goal  Description: Patient's Long Term Goal: To have bleeding fully resolved and will have no recurrence. Interventions:  - home safety  - activity as directed  - monitoring of symptoms  - See additional Care Plan goals for specific interventions  Outcome: Progressing  Goal: Patient/Family Short Term Goal  Description: Patient's Short Term Goal: To have dizziness improve and able to get out of bed tomorrow. 8/6: safety, monitor Hgb, monitor for bleeding  8/7 noc: safety, monitor Hgb, monitor for bleeding  8/8 AM: control bleeding  8/8 noc: monitor bleeding, monitor Hgb  8/9 AM: monitor bleeding  8/10 NOC: Monitor for bleeding  8/11 NOC: sleep, monitor for bleeding  Interventions:   - CLD  - IVF  - serial Hg  - monitor for further or increase in rectal bleeding  - See additional Care Plan goals for specific interventions  Outcome: Progressing     Problem: SAFETY ADULT - FALL  Goal: Free from fall injury  Description: INTERVENTIONS:  - Assess pt frequently for physical needs  - Identify cognitive and physical deficits and behaviors that affect risk of falls.   - [place] fall precautions as indicated by assessment.  - Educate pt/family on patient safety including physical limitations  - Instruct pt to call for assistance with activity based on assessment  - Modify environment to reduce risk of injury  - Provide assistive devices as appropriate  - Consider OT/PT consult to assist with strengthening/mobility  - Encourage toileting schedule  Outcome: Progressing     Problem: GASTROINTESTINAL - ADULT  Goal: Maintains or returns to baseline bowel function  Description: INTERVENTIONS:  - Assess bowel function  - Maintain adequate hydration with IV or PO as ordered and tolerated  - Evaluate effectiveness of GI medications  - Encourage mobilization and activity  - Obtain nutritional consult as needed  - Establish a toileting routine/schedule  - Consider collaborating with pharmacy to review patient's medication profile  Outcome: Progressing     Problem: HEMATOLOGIC - ADULT  Goal: Maintains hematologic stability  Description: INTERVENTIONS  - Assess for signs and symptoms of bleeding or hemorrhage  - Monitor labs and vital signs for trends  - Administer supportive blood products/factors, fluids and medications as ordered and appropriate  - Administer supportive blood products/factors as ordered and appropriate  Outcome: Progressing     Problem: Patient/Family Goals  Goal: Patient/Family Long Term Goal  Description: Patient's Long Term Goal: To have bleeding fully resolved and will have no recurrence. Interventions:  - home safety  - activity as directed  - monitoring of symptoms  - See additional Care Plan goals for specific interventions  Outcome: Progressing  Goal: Patient/Family Short Term Goal  Description: Patient's Short Term Goal: To have dizziness improve and able to get out of bed tomorrow. 8/6: safety, monitor Hgb, monitor for bleeding  8/7 noc: safety, monitor Hgb, monitor for bleeding  8/8 AM: control bleeding  8/8 noc: monitor bleeding, monitor Hgb  8/9 AM: monitor bleeding  8/10 NOC: Monitor for bleeding  8/11 NOC: sleep, monitor for bleeding  Interventions:   - CLD  - IVF  - serial Hg  - monitor for further or increase in rectal bleeding  - See additional Care Plan goals for specific interventions  Outcome: Progressing     Problem: SAFETY ADULT - FALL  Goal: Free from fall injury  Description: INTERVENTIONS:  - Assess pt frequently for physical needs  - Identify cognitive and physical deficits and behaviors that affect risk of falls.   - [place] fall precautions as indicated by assessment.  - Educate pt/family on patient safety including physical limitations  - Instruct pt to call for assistance with activity based on assessment  - Modify environment to reduce risk of injury  - Provide assistive devices as appropriate  - Consider OT/PT consult to assist with strengthening/mobility  - Encourage toileting schedule  Outcome: Progressing     Problem: GASTROINTESTINAL - ADULT  Goal: Maintains or returns to baseline bowel function  Description: INTERVENTIONS:  - Assess bowel function  - Maintain adequate hydration with IV or PO as ordered and tolerated  - Evaluate effectiveness of GI medications  - Encourage mobilization and activity  - Obtain nutritional consult as needed  - Establish a toileting routine/schedule  - Consider collaborating with pharmacy to review patient's medication profile  Outcome: Progressing     Problem: HEMATOLOGIC - ADULT  Goal: Maintains hematologic stability  Description: INTERVENTIONS  - Assess for signs and symptoms of bleeding or hemorrhage  - Monitor labs and vital signs for trends  - Administer supportive blood products/factors, fluids and medications as ordered and appropriate  - Administer supportive blood products/factors as ordered and appropriate  Outcome: Progressing

## 2023-08-13 LAB
ANION GAP SERPL CALC-SCNC: 3 MMOL/L (ref 0–18)
BASOPHILS # BLD AUTO: 0.01 X10(3) UL (ref 0–0.2)
BASOPHILS NFR BLD AUTO: 0.1 %
BUN BLD-MCNC: 15 MG/DL (ref 7–18)
CALCIUM BLD-MCNC: 7.9 MG/DL (ref 8.5–10.1)
CHLORIDE SERPL-SCNC: 111 MMOL/L (ref 98–112)
CO2 SERPL-SCNC: 26 MMOL/L (ref 21–32)
CREAT BLD-MCNC: 0.66 MG/DL
EGFRCR SERPLBLD CKD-EPI 2021: 93 ML/MIN/1.73M2 (ref 60–?)
EOSINOPHIL # BLD AUTO: 0.16 X10(3) UL (ref 0–0.7)
EOSINOPHIL NFR BLD AUTO: 2.1 %
ERYTHROCYTE [DISTWIDTH] IN BLOOD BY AUTOMATED COUNT: 18 %
GLUCOSE BLD-MCNC: 107 MG/DL (ref 70–99)
HCT VFR BLD AUTO: 25.4 %
HGB BLD-MCNC: 7.9 G/DL
IMM GRANULOCYTES # BLD AUTO: 0.02 X10(3) UL (ref 0–1)
IMM GRANULOCYTES NFR BLD: 0.3 %
LYMPHOCYTES # BLD AUTO: 0.77 X10(3) UL (ref 1–4)
LYMPHOCYTES NFR BLD AUTO: 9.9 %
MCH RBC QN AUTO: 28.9 PG (ref 26–34)
MCHC RBC AUTO-ENTMCNC: 31.1 G/DL (ref 31–37)
MCV RBC AUTO: 93 FL
MONOCYTES # BLD AUTO: 0.61 X10(3) UL (ref 0.1–1)
MONOCYTES NFR BLD AUTO: 7.9 %
NEUTROPHILS # BLD AUTO: 6.2 X10 (3) UL (ref 1.5–7.7)
NEUTROPHILS # BLD AUTO: 6.2 X10(3) UL (ref 1.5–7.7)
NEUTROPHILS NFR BLD AUTO: 79.7 %
OSMOLALITY SERPL CALC.SUM OF ELEC: 291 MOSM/KG (ref 275–295)
PLATELET # BLD AUTO: 270 10(3)UL (ref 150–450)
POTASSIUM SERPL-SCNC: 4 MMOL/L (ref 3.5–5.1)
RBC # BLD AUTO: 2.73 X10(6)UL
SODIUM SERPL-SCNC: 140 MMOL/L (ref 136–145)
WBC # BLD AUTO: 7.8 X10(3) UL (ref 4–11)

## 2023-08-13 NOTE — PLAN OF CARE
Problem: Patient/Family Goals  Goal: Patient/Family Long Term Goal  Description: Patient's Long Term Goal: To have bleeding fully resolved and will have no recurrence. Interventions:  - home safety  - activity as directed  - monitoring of symptoms  - See additional Care Plan goals for specific interventions  Outcome: Progressing  Goal: Patient/Family Short Term Goal  Description: Patient's Short Term Goal: To have dizziness improve and able to get out of bed tomorrow. 8/6: safety, monitor Hgb, monitor for bleeding  8/7 noc: safety, monitor Hgb, monitor for bleeding  8/8 AM: control bleeding  8/8 noc: monitor bleeding, monitor Hgb  8/9 AM: monitor bleeding  8/10 NOC: Monitor for bleeding  8/11 NOC: sleep, monitor for bleeding  8/12 nocs: sleep, no bleeding  Interventions:   - CLD  - IVF  - serial Hg  - monitor for further or increase in rectal bleeding  - See additional Care Plan goals for specific interventions  Outcome: Progressing     Problem: Patient/Family Goals  Goal: Patient/Family Short Term Goal  Description: Patient's Short Term Goal: To have dizziness improve and able to get out of bed tomorrow. 8/6: safety, monitor Hgb, monitor for bleeding  8/7 noc: safety, monitor Hgb, monitor for bleeding  8/8 AM: control bleeding  8/8 noc: monitor bleeding, monitor Hgb  8/9 AM: monitor bleeding  8/10 NOC: Monitor for bleeding  8/11 NOC: sleep, monitor for bleeding  8/12 nocs: sleep, no bleeding  Interventions:   - CLD  - IVF  - serial Hg  - monitor for further or increase in rectal bleeding  - See additional Care Plan goals for specific interventions  Outcome: Progressing     Problem: SAFETY ADULT - FALL  Goal: Free from fall injury  Description: INTERVENTIONS:  - Assess pt frequently for physical needs  - Identify cognitive and physical deficits and behaviors that affect risk of falls.   - [place] fall precautions as indicated by assessment.  - Educate pt/family on patient safety including physical limitations  - Instruct pt to call for assistance with activity based on assessment  - Modify environment to reduce risk of injury  - Provide assistive devices as appropriate  - Consider OT/PT consult to assist with strengthening/mobility  - Encourage toileting schedule  Outcome: Progressing     Problem: GASTROINTESTINAL - ADULT  Goal: Maintains or returns to baseline bowel function  Description: INTERVENTIONS:  - Assess bowel function  - Maintain adequate hydration with IV or PO as ordered and tolerated  - Evaluate effectiveness of GI medications  - Encourage mobilization and activity  - Obtain nutritional consult as needed  - Establish a toileting routine/schedule  - Consider collaborating with pharmacy to review patient's medication profile  Outcome: Progressing

## 2023-08-14 VITALS
OXYGEN SATURATION: 97 % | HEART RATE: 83 BPM | SYSTOLIC BLOOD PRESSURE: 131 MMHG | HEIGHT: 64 IN | DIASTOLIC BLOOD PRESSURE: 60 MMHG | TEMPERATURE: 98 F | WEIGHT: 250 LBS | BODY MASS INDEX: 42.68 KG/M2 | RESPIRATION RATE: 25 BRPM

## 2023-08-14 LAB
ANION GAP SERPL CALC-SCNC: 3 MMOL/L (ref 0–18)
BASOPHILS # BLD AUTO: 0.02 X10(3) UL (ref 0–0.2)
BASOPHILS NFR BLD AUTO: 0.3 %
BILIRUB UR QL STRIP.AUTO: NEGATIVE
BUN BLD-MCNC: 14 MG/DL (ref 7–18)
CALCIUM BLD-MCNC: 8.4 MG/DL (ref 8.5–10.1)
CHLORIDE SERPL-SCNC: 112 MMOL/L (ref 98–112)
CLARITY UR REFRACT.AUTO: CLEAR
CO2 SERPL-SCNC: 26 MMOL/L (ref 21–32)
CREAT BLD-MCNC: 0.52 MG/DL
EGFRCR SERPLBLD CKD-EPI 2021: 100 ML/MIN/1.73M2 (ref 60–?)
EOSINOPHIL # BLD AUTO: 0.28 X10(3) UL (ref 0–0.7)
EOSINOPHIL NFR BLD AUTO: 4 %
ERYTHROCYTE [DISTWIDTH] IN BLOOD BY AUTOMATED COUNT: 18 %
GLUCOSE BLD-MCNC: 101 MG/DL (ref 70–99)
GLUCOSE BLD-MCNC: 130 MG/DL (ref 70–99)
GLUCOSE UR STRIP.AUTO-MCNC: NORMAL MG/DL
HCT VFR BLD AUTO: 26.9 %
HGB BLD-MCNC: 8.6 G/DL
IMM GRANULOCYTES # BLD AUTO: 0.03 X10(3) UL (ref 0–1)
IMM GRANULOCYTES NFR BLD: 0.4 %
KETONES UR STRIP.AUTO-MCNC: NEGATIVE MG/DL
LEUKOCYTE ESTERASE UR QL STRIP.AUTO: NEGATIVE
LYMPHOCYTES # BLD AUTO: 0.89 X10(3) UL (ref 1–4)
LYMPHOCYTES NFR BLD AUTO: 12.7 %
MCH RBC QN AUTO: 30 PG (ref 26–34)
MCHC RBC AUTO-ENTMCNC: 32 G/DL (ref 31–37)
MCV RBC AUTO: 93.7 FL
MONOCYTES # BLD AUTO: 0.63 X10(3) UL (ref 0.1–1)
MONOCYTES NFR BLD AUTO: 9 %
NEUTROPHILS # BLD AUTO: 5.14 X10 (3) UL (ref 1.5–7.7)
NEUTROPHILS # BLD AUTO: 5.14 X10(3) UL (ref 1.5–7.7)
NEUTROPHILS NFR BLD AUTO: 73.6 %
OSMOLALITY SERPL CALC.SUM OF ELEC: 293 MOSM/KG (ref 275–295)
PH UR STRIP.AUTO: 6.5 [PH] (ref 5–8)
PLATELET # BLD AUTO: 283 10(3)UL (ref 150–450)
POTASSIUM SERPL-SCNC: 4.1 MMOL/L (ref 3.5–5.1)
PROT UR STRIP.AUTO-MCNC: NEGATIVE MG/DL
RBC # BLD AUTO: 2.87 X10(6)UL
RBC UR QL AUTO: NEGATIVE
SODIUM SERPL-SCNC: 141 MMOL/L (ref 136–145)
SP GR UR STRIP.AUTO: 1.02 (ref 1–1.03)
UROBILINOGEN UR STRIP.AUTO-MCNC: NORMAL MG/DL
WBC # BLD AUTO: 7 X10(3) UL (ref 4–11)

## 2023-08-14 RX ORDER — MIDODRINE HYDROCHLORIDE 2.5 MG/1
2.5 TABLET ORAL 3 TIMES DAILY
Status: DISCONTINUED | OUTPATIENT
Start: 2023-08-14 | End: 2023-08-14

## 2023-08-14 RX ORDER — MIDODRINE HYDROCHLORIDE 2.5 MG/1
2.5 TABLET ORAL 3 TIMES DAILY
Qty: 45 TABLET | Refills: 0 | Status: SHIPPED | OUTPATIENT
Start: 2023-08-15

## 2023-08-14 NOTE — PHYSICAL THERAPY NOTE
PHYSICAL THERAPY TREATMENT NOTE - INPATIENT    Room Number: 529/529-A     Session: 3     Number of Visits to Meet Established Goals: 5    Presenting Problem: rectal bleeding  Co-Morbidities : H/o gastritis, LGIB, HL, lymphedema, L CARMEN, B TKA (working w/ wt loss clinic to get ready for right hip replacement); reports recent onset of vertigo 3 months ago    History related to current admission: Patient is a 79year old male admitted on 8/4/2023 from home for rectal bleeding. Pt diagnosed with splenic rupture. COLONOSCOPY COMPLETED 8/4: Numerous diverticulum transverse, descending, sigmoid colon. Old blood and clots lavaged. Clots and fibrous debris in rectum unable to suction. Second look at transverse and left colon. No active bleed or visible vessel. Normal terminal ileum, no clots or old blood right colon or transverse colon suggesting resolved diverticular bleed of left colon     Imaging: NM GI Blood Loss: Abnormal uptake within the splenic flexure consistent with active bleeding. Correlate clinically. RRT called 8/4 due to splenic rupture      Received PRBC unit on 8/6 due to low HgB (6/6)     ASSESSMENT     Pt continues to be orthostatic with reports of dizziness. Pt reports dizziness has decreased in severity compared to previous sessions. Pt amb distance limited by dizziness this session but pt was able to increase distance compared to last session from 0 to 20 feet with RW CGA. Pt continues to present with decrease balance, decrease activity tolerance due to dizziness, orthostatic BP, and weakness. Pt may benefit from continued PT while in inpt setting to address above deficits and facilitate return to PLOF. DISCHARGE RECOMMENDATIONS  PT Discharge Recommendations: Outpatient PT     PLAN  PT Treatment Plan: Bed mobility; Body mechanics; Endurance; Patient education; Energy conservation; Family education;Gait training;Neuromuscular re-educate;Range of motion;Strengthening;Stoop training;Stair training;Transfer training;Balance training  Rehab Potential : Good  Frequency (Obs): 3-5x/week    CURRENT GOALS     Goal #1 Patient is able to demonstrate supine - sit EOB @ level: modified independent      Goal #2 Patient is able to demonstrate transfers Sit to/from Stand at assistance level: modified independent      Goal #3 Patient is able to ambulate 50 feet with assist device: walker - rolling at assistance level: supervision      Goal #4 Pt able to ascend/descend 7 steps with supervision for home navigation. Goal #5     Goal #6     Goal Comments: Goals established on 8/5/2023 8/14/2023 all goals ongoing      SUBJECTIVE  \"I was able to walk to the bathroom this morning. \"    OBJECTIVE  Precautions: Bed/chair alarm (orthostatic BP)    WEIGHT BEARING RESTRICTION  Weight Bearing Restriction: None                PAIN ASSESSMENT   Rating: Unable to rate  Location: R hip  Management Techniques: Repositioning    BALANCE                                                                                                                       Static Sitting: Fair  Dynamic Sitting: Fair           Static Standing: Fair  Dynamic Standing: Fair -    ACTIVITY TOLERANCE           BP: 117/69  BP Location: Right arm  BP Method: Automatic  Patient Position: Sitting    O2 WALK         AM-PAC '6-Clicks' INPATIENT SHORT FORM - BASIC MOBILITY  How much difficulty does the patient currently have. .. Patient Difficulty: Turning over in bed (including adjusting bedclothes, sheets and blankets)?: A Little   Patient Difficulty: Sitting down on and standing up from a chair with arms (e.g., wheelchair, bedside commode, etc.): A Little   Patient Difficulty: Moving from lying on back to sitting on the side of the bed?: A Little   How much help from another person does the patient currently need. ..    Help from Another: Moving to and from a bed to a chair (including a wheelchair)?: A Little   Help from Another: Need to walk in hospital room?: A Little   Help from Another: Climbing 3-5 steps with a railing?: A Lot       AM-PAC Score:  Raw Score: 17   Approx Degree of Impairment: 50.57%   Standardized Score (AM-PAC Scale): 42.13   CMS Modifier (G-Code): CK    FUNCTIONAL ABILITY STATUS  Gait Assessment   Functional Mobility/Gait Assessment  Gait Assistance: Contact guard assist  Distance (ft): 20  Assistive Device: Rolling walker  Pattern: Shuffle    Skilled Therapy Provided    Bed Mobility:  Rolling: supervision   Supine<>Sit: mod assist for B LE   Sit<>Supine: mod assist for B LE     Transfer Mobility:  Sit<>Stand: CGA   Stand<>Sit: CGA   Gait: pt amb x 40 feet with RW with CGA, shuffled gait, limited secondary to complaints of dizziness. Therapist's Comments: per RN pt ok to be seen. Pt received in bed and agreeable to therapy. Pt instructed in supine with HOB elevated ex for B LE. Pt has increase difficulty with R LE compared to L. Pt BP monitored with the following:  Sitting 120/50  Standing 107/61  Back to sitting after amb 117/69  Pt reports dizziness with all position changes but reports was able to amb/maintain balance. Pt amb within pt room and was limited due to increase dizziness. Pt seated back to EOB and returned to supine with mod assist for B LE. PCT in to assist with boost up to [facility] via draw sheet with total assist.  Pt educated on activity/discharge recommendations, left in bed with call light and needs within reach and bed alarm set. THERAPEUTIC EXERCISES  Lower Extremity Ankle pumps  Hip AB/AD  Heel slides  SAQ     Upper Extremity N/a     Position Supine with HOB elevated     Repetitions   10-20   Sets   1-2     Patient End of Session: In bed;Needs met;Call light within reach;RN aware of session/findings; All patient questions and concerns addressed; Alarm set    PT Session Time: 30 minutes  Gait Trainin minutes  Therapeutic Activity: 7 minutes  Therapeutic Exercise: 15 minutes

## 2023-08-14 NOTE — PLAN OF CARE
Pt is A&Ox4. Wears glasses. VSS, afebrile. Maintaining O2 sats WDL on RA. Encouraged IS. Tele, NSR. No A/C. Orthos Qshift. Last BM 8/13. Soft/low fiber diet. Voids per urinal. Up x1 and walker. Denies pain. PIV, SL. No further needs at this time, continue POC. Safety precautions in place. Problem: Patient/Family Goals  Goal: Patient/Family Long Term Goal  Description: Patient's Long Term Goal: To have bleeding fully resolved and will have no recurrence. Interventions:  - home safety  - activity as directed  - monitoring of symptoms  - See additional Care Plan goals for specific interventions  Outcome: Progressing  Goal: Patient/Family Short Term Goal  Description: Patient's Short Term Goal: To have dizziness improve and able to get out of bed tomorrow. 8/6: safety, monitor Hgb, monitor for bleeding  8/7 noc: safety, monitor Hgb, monitor for bleeding  8/8 AM: control bleeding  8/8 noc: monitor bleeding, monitor Hgb  8/9 AM: monitor bleeding  8/10 NOC: Monitor for bleeding  8/11 NOC: sleep, monitor for bleeding  8/12 nocs: sleep, no bleeding  8/13 NOC: sleep    Interventions:   - CLD  - IVF  - serial Hg  - monitor for further or increase in rectal bleeding  - See additional Care Plan goals for specific interventions  Outcome: Progressing     Problem: SAFETY ADULT - FALL  Goal: Free from fall injury  Description: INTERVENTIONS:  - Assess pt frequently for physical needs  - Identify cognitive and physical deficits and behaviors that affect risk of falls.   - [place] fall precautions as indicated by assessment.  - Educate pt/family on patient safety including physical limitations  - Instruct pt to call for assistance with activity based on assessment  - Modify environment to reduce risk of injury  - Provide assistive devices as appropriate  - Consider OT/PT consult to assist with strengthening/mobility  - Encourage toileting schedule  Outcome: Progressing     Problem: GASTROINTESTINAL - ADULT  Goal: Maintains or returns to baseline bowel function  Description: INTERVENTIONS:  - Assess bowel function  - Maintain adequate hydration with IV or PO as ordered and tolerated  - Evaluate effectiveness of GI medications  - Encourage mobilization and activity  - Obtain nutritional consult as needed  - Establish a toileting routine/schedule  - Consider collaborating with pharmacy to review patient's medication profile  Outcome: Progressing     Problem: HEMATOLOGIC - ADULT  Goal: Maintains hematologic stability  Description: INTERVENTIONS  - Assess for signs and symptoms of bleeding or hemorrhage  - Monitor labs and vital signs for trends  - Administer supportive blood products/factors, fluids and medications as ordered and appropriate  - Administer supportive blood products/factors as ordered and appropriate  Outcome: Progressing

## 2023-08-14 NOTE — PLAN OF CARE
Problem: Patient/Family Goals  Goal: Patient/Family Long Term Goal  Description: Patient's Long Term Goal: To have bleeding fully resolved and will have no recurrence. Interventions:  - home safety  - activity as directed  - monitoring of symptoms  - See additional Care Plan goals for specific interventions  Outcome: Completed  Goal: Patient/Family Short Term Goal  Description: Patient's Short Term Goal: To have dizziness improve and able to get out of bed tomorrow. 8/6: safety, monitor Hgb, monitor for bleeding  8/7 noc: safety, monitor Hgb, monitor for bleeding  8/8 AM: control bleeding  8/8 noc: monitor bleeding, monitor Hgb  8/9 AM: monitor bleeding  8/10 NOC: Monitor for bleeding  8/11 NOC: sleep, monitor for bleeding  8/12 nocs: sleep, no bleeding  8/13 NOC: sleep    Interventions:   - CLD  - IVF  - serial Hg  - monitor for further or increase in rectal bleeding  - See additional Care Plan goals for specific interventions  Outcome: Completed     Problem: SAFETY ADULT - FALL  Goal: Free from fall injury  Description: INTERVENTIONS:  - Assess pt frequently for physical needs  - Identify cognitive and physical deficits and behaviors that affect risk of falls.   - [place] fall precautions as indicated by assessment.  - Educate pt/family on patient safety including physical limitations  - Instruct pt to call for assistance with activity based on assessment  - Modify environment to reduce risk of injury  - Provide assistive devices as appropriate  - Consider OT/PT consult to assist with strengthening/mobility  - Encourage toileting schedule  Outcome: Completed     Problem: GASTROINTESTINAL - ADULT  Goal: Maintains or returns to baseline bowel function  Description: INTERVENTIONS:  - Assess bowel function  - Maintain adequate hydration with IV or PO as ordered and tolerated  - Evaluate effectiveness of GI medications  - Encourage mobilization and activity  - Obtain nutritional consult as needed  - Establish a toileting routine/schedule  - Consider collaborating with pharmacy to review patient's medication profile  Outcome: Completed     Problem: HEMATOLOGIC - ADULT  Goal: Maintains hematologic stability  Description: INTERVENTIONS  - Assess for signs and symptoms of bleeding or hemorrhage  - Monitor labs and vital signs for trends  - Administer supportive blood products/factors, fluids and medications as ordered and appropriate  - Administer supportive blood products/factors as ordered and appropriate  Outcome: Completed

## 2023-08-14 NOTE — OCCUPATIONAL THERAPY NOTE
Attempted to see the patient for OT session. Per patient, he might be discharging home today. He prefers to rest to prepare for the discharge. Will continue to follow.

## 2023-08-14 NOTE — PROGRESS NOTES
NURSING DISCHARGE NOTE    Discharged Home via Wheelchair. Accompanied by Family member  Belongings Taken by patient/family. Patient stable upon discharge. IV's removed. Discharge instructions and information given to the patient and daughter at the bedside, No further questions at this time.

## 2023-08-14 NOTE — DISCHARGE SUMMARY
[facility] Internal Medicine Discharge Summary   Patient ID:  Meryl Fajardo  [id number]  79year old  9/25/1940    Admit date: 8/4/2023    Discharge date and time: 8/14/2023     Attending Physician: Tracie Sequeira MD     Primary Care Physician: Fidelina Rosen MD     Admit Dx: Gastrointestinal hemorrhage, unspecified gastrointestinal hemorrhage type [facility] Discharge Diagnoses:  GIB    Disposition: home  -PT/OT -->  Outpatient PT    - lives c son and daughter in law    Important follow up:  -PCP in [x] within 7 days [] within 14 days [] other    Neha Vela MD  [address] [facility].  [phone]    Follow up in 2 week(s)  Follow up      Please refer to prior H&P by Rhea Hearn MD  on 8/4      History of Present Illness: Meryl Fajardo is a 79year old male with hx gastritis, LGIB, HL who presented to the ED with hematochezia that woke him from sleep around 0230. It was bright red with small clots noted. He is not on [facility] but does take meloxicam & 81mg aspirin daily. On arrival he was afebrile and hemodynamically stable. Labs notable for Hgb 11.7, otherwise unremarkable. GI was consulted who recommended tagged WBC scan. She was given iv protonix & IVF. Following admission he reports feeling back to normal and that his urge to have a bowel movement is resolved. He denies abdominal pain/nausea any other new or worsening symptoms. He reports a similar episode that was treated to diverticular bleed occurring approximate 20 years ago but been fine since then. Hospital Course:   Meryl Fajardo Is a a 79year old male who presents with hematochezia        Plan     Hematochezia- no recurrence thus far  History Lower GI Bleed  - Likely diverticular  - GI consulted, appreciate: tagged RBC scan initially with bleeding at splenic flexure.  However colonoscopy with old blood and no active bleeding.  - flex sig 8/8 post BM c blood showing: [place]-appearing red blood + clots in the distal sigmoid colon within a segment of significant diverticular involvement. Multiple diverticula with inspissated stool + clots, but no active bleeding or culprit diverticulum identified. CTA a/p noted, neg for active bleed; apprec surgery c/s   - Continue ppi     Acute Blood Loss Anemia-currently Hb stable  Hypotension, dizziness- possibly was multifactoria d/t hematochezia, but also recently was started on flomax (1 month ago per pt)--says was noted to have mildly enlarged prostate but never had issues with urinary retention   baseline Hb 13-14  - transfuse to maintain goal Hgb > 7  -dc flomax for now. Orthostats q shift. Encourage po fluids  -Started Venofer daily x5 days  - 8/12: Given persistent orthostatic hypotension will give 500 cc IV fluid bolus and monitor. Give another 500cc 8/13 as some low BPs in AM. will start low dose midodrine 2.5mg TID as still some slight dizziness, close o/p f/u of BP and hgb as o/p   - check hgb 8/16     Dark Urine  - encourage PO intake, UA sent, noted, await Ucx     DVT Mechanical Prophylaxis:   SCDs,    DVT Pharmacologic Prophylaxis   Medication   None        Code Status: Full Code          Day of discharge Exam    08/14/23  1526   BP: 131/60   Pulse: 83   Resp: 25   Temp:        Exam on day of discharge:  Some mild dizziness when up this AM but improved. Walked yesterday. Had a couple BMs, no blood, no pain. Gen: No acute distress, alert and oriented  CV: RRR, +s1/s2  Lungs: CTAB, good respiratory effort  Abdomen: s/nt/nd  Ext: Moves all 4 extremities, no c/c/e  Neuro: CN Intact, no focal deficits      Discharge meds     Medication List        START taking these medications      midodrine 2.5 MG Tabs  Commonly known as: ProAmatine  Take 1 tablet (2.5 mg total) by mouth in the morning and 1 tablet (2.5 mg total) at noon and 1 tablet (2.5 mg total) in the evening.   Start taking on: August 15, 2023     pantoprazole 40 MG Tbec  Commonly known as: Protonix  Take 1 tablet (40 mg total) by mouth every morning before breakfast.            CONTINUE taking these medications      atorvastatin 40 MG Tabs  Commonly known as: Lipitor     CENTRUM SILVER OR     CITRACAL +D3 OR     Flaxseed Oil 1000 MG Caps     metFORMIN 500 MG Tabs  Commonly known as: Glucophage     NEURIVA PLUS OR            STOP taking these medications      aspirin 81 MG Tbec     Meloxicam 7.5 MG Tabs     omeprazole 20 MG Cpdr  Commonly known as: PriLOSEC     tamsulosin 0.4 MG Caps  Commonly known as: Flomax               Where to Get Your Medications        These medications were sent to [place] [id number] AT [address], [address] [address]      Phone: [phone]   midodrine 2.5 MG Tabs       You can get these medications from any pharmacy    Bring a paper prescription for each of these medications  pantoprazole 40 MG Tbec         Consults: IP CONSULT TO GASTROENTEROLOGY  IP CONSULT TO HOSPITALIST  IP CONSULT TO RESPIRATORY CARE  IP CONSULT TO SPIRITUAL CARE  IP CONSULT TO GENERAL SURGERY    Radiology: CTA ABDOMEN PELVIS (INCLUDES ILIAC ARTERIES)(QWD=17126)    Result Date: 8/8/2023  PROCEDURE:  CTA ABDOMEN PELVIS (INCLUDES ILIAC ARTERIES)(CPT=74175)  COMPARISON:  [facility] , CT, CT CHEST+ABDOMEN+PELVIS(ALL CNTRST ONLY)(CPT=71260/26734), 1/15/2018, 2:14 PM.  INDICATIONS:  Recurrent diverticular bleeding w/ BRBPR, endoscopic evaluation x2 w/o culprit lesion identified. Prior tRBC scan 8/4 positive for splenic flexure source (COL 8  TECHNIQUE:  CT images of the abdomen and pelvis were obtained pre- and post- injection of non-ionic intravenous contrast material. Multi-planar reformatted/3-D images were created to optimize visualization of vascular anatomy. Dose reduction techniques were used.  Dose information is transmitted to the ACR ([facility] [employer] of Radiology) [address]) which includes the Dose Index Registry. PATIENT STATED HISTORY:(As transcribed by Technologist)  Patient has recurrent diverticular bleeding   CONTRAST USED:  100cc of Isovue 370  FINDINGS:  AORTA/VASCULAR:  Mild calcified atherosclerosis of the abdominal aorta and major arterial branches without aneurysm, dissection, or flow-limiting stenosis. Accessory right renal artery noted. There are no sites of active GI bleeding on arterial or delayed phases. LIVER:  No enlargement, atrophy, abnormal density, or significant focal lesion. BILIARY:  No visible dilatation or calcification. PANCREAS:  No lesion, fluid collection, ductal dilatation, or atrophy. SPLEEN:  No enlargement or focal lesion. KIDNEYS:  No mass, obstruction, or calcification. Bilateral simple renal cortical cysts. No obstructive uropathy. ADRENALS:  No mass or enlargement. RETROPERITONEUM:  Normal.  No mass or adenopathy. BOWEL/MESENTERY:  No evidence of bowel obstruction. Thickening of the junction of the pylorus and proximal duodenum over a 4 cm length (series 5, image 100). Extensive colonic diverticulosis without evidence of active diverticular inflammation or bleeding. ABDOMINAL WALL:  Subcutaneous edema bilateral flanks. No hernia. URINARY BLADDER:  No visible focal wall thickening, lesion, or calculus. PELVIC NODES:  No adenopathy. PELVIC ORGANS:  No visible mass. Pelvic organs appropriate for patient age. 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 LUNG BASES:  Small bilateral pleural effusions. Trace pericardial effusion. OTHER:  Negative. CONCLUSION:   1. Mild calcified atherosclerosis of the abdominal aorta and major arterial branches without aneurysm, dissection, or flow-limiting stenosis. 2. No evidence of active bleeding within the GI tract on arterial or delayed phase imaging.   3. Thickening of the junction of the pylorus and proximal duodenum may reflect localized inflammation versus underlying mucosal lesion, best assessed with endoscopy. LOCATION:  [facility]   Dictated by (CST): Ayleen Mullen MD on 8/08/2023 at 5:39 PM     Finalized by (CST): Ayleen Mullen MD on 8/08/2023 at 5:51 PM       NM GI BLOOD LOSS STUDY SPECT/CT (SINGLE) 1 DAY (CPT=78278/19930)    Result Date: 8/4/2023  PROCEDURE:  NM GI BLOOD LOSS STUDY SPECT/CT(SINGLE) 1 DAY (CPT=78278/35219)  COMPARISON:  None. INDICATION:   TECHNIQUE:  28.2 mCi Tc99m autologously labelled RBC's were re-injected intravenously, and dynamic images of the abdomen were obtained in the anterior projection for at least one hour. Additional dedicated SPECT images were taken with concurrent CT scan for both anatomical localization as well as attenuation correction. Scan was reformatted into multi-planar reconstructions on a dedicated workstation. FINDINGS:  ABNORMAL FOCI:  Within the splenic flexure. CONCLUSION:  1. Abnormal uptake within the splenic flexure consistent with active bleeding. Correlate clinically. Critical findings discussed with the patient's nurseAna at approximately 1120 hours on 8/4/2023. Read back performed. LOCATION:  [facility]      Dictated by (CST): Mayelin Hurt MD on 8/04/2023 at 11:10 AM     Finalized by (CST): Mayelin Hurt MD on 8/04/2023 at 11:21 AM       XR FINGER(S) (MIN 2 VIEWS), RIGHT 4TH (CPT=73140)    Result Date: 7/31/2023  PROCEDURE:  XR FINGER(S) (MIN 2 VIEWS), RIGHT 4TH (CPT=73140)  INDICATIONS:  right ring finger injury yesterday  COMPARISON:  None. TECHNIQUE:  Three views of the finger were obtained. PATIENT STATED HISTORY: (As transcribed by Technologist)  Patient shares he injured his distal right 4th digit as he tried to  a case of water and his 4th finger got stuck within the plastic handle of the water. Patient has a bruised the distal digit around the posterior nail bed.     FINDINGS:  There is severe osteoarthritic change noted involving the PIP and DIP joints of the 4th digit.  There is bony erosive changes noted involving the head of the proximal phalanx of the 4th digit. There is marked spurring noted at the PIP and DIP  joint. An acute fracture of the 4th digit is not appreciated. There is also severe joint space narrowing involving the PIP joints of the 2nd, 3rd and 5th digits and there is moderate joint space narrowing of the interphalangeal joint of the thumb and the DIP joint of the 2nd and 3rd digits. There is also severe joint space narrowing of the DIP joint of the 4th and 5th digits. Osteoarthritic changes are also noted involving the lateral wrist at the carpal-first metacarpal joint with joint space narrowing and moderate spurring. CONCLUSION:  Extensive osteoarthritic changes are noted as detailed above. No acute 4th digit fracture. [facility]   Dictated by (CST): Ann Mcmahan MD on 7/31/2023 at 1:22 PM     Finalized by (CST): Ann Mcmahan MD on 7/31/2023 at 1:25 PM          Operative Procedures: Procedure(s) (LRB):  FLEXIBLE SIGMOIDOSCOPY (N/A)     Code Status: Full Code    Total Time Coordinating Care: Greater than 30 minutes    Patient had opportunity to ask questions and state understand and agree with therapeutic plan as outlined. I reviewed and reconciled current and discharge medications on day of discharge and discussed meds with patient. D/w RN     Vilma Stanton MD  [facility]  [phone]  8/14/2023  4:17 PM    Addendum  Ucx c 50-99K GNR. Sent msg to PCP, we just spoke.  Dr. Britt Randall will call pt     Vilma Stanton MD  [facility]  [phone]  8/15/2023  12:16 PM

## (undated) DEVICE — 10FT COMBINED O2 DELIVERY/CO2 MONITORING. FILTER WITH MICROSTREAM TYPE LUER: Brand: DUAL ADULT NASAL CANNULA

## (undated) DEVICE — ENDOSCOPY PACK - LOWER: Brand: MEDLINE INDUSTRIES, INC.

## (undated) DEVICE — BIOGUARD CLEANING ADAPTER

## (undated) DEVICE — 1200CC GUARDIAN II: Brand: GUARDIAN

## (undated) DEVICE — KIT ENDO ORCAPOD 160/180/190

## (undated) DEVICE — 3M™ RED DOT™ MONITORING ELECTRODE WITH FOAM TAPE AND STICKY GEL, 50/BAG, 20/CASE, 72/PLT 2570: Brand: RED DOT™

## (undated) NOTE — LETTER
Sapna Dumont [address]  [place], [address] [facility]  Authorization for Surgical Operation and Procedure   Date:___________                                                                                                         Time:__________  I hereby Zev Beyer MD, my physician and his/her assistants (if applicable), which may include medical students, residents, and/or fellows, to perform the following surgical operation/ procedure and administer such anesthesia as may be determined necessary by my physician:  Operation/Procedure name (s) Procedure(s):  COLONOSCOPY on [address] [place] [address]   2. I recognize that during the surgical operation/procedure, unforeseen conditions may necessitate additional or different procedures than those listed above. I, therefore, further authorize and request that the above-named surgeon, assistants, or designees perform such procedures as are, in their judgment, necessary and desirable. 3.   My surgeon/physician has discussed prior to my surgery the potential benefits, risks and side effects of this procedure; the likelihood of achieving goals; and potential problems that might occur during recuperation. They also discussed reasonable alternatives to the procedure, including risks, benefits, and side effects related to the alternatives and risks related to not receiving this procedure. I have had all my questions answered and I acknowledge that no guarantee has been made as to the result that may be obtained. 4.   Should the need arise during my operation or immediate post-operative period, I also consent to the administration of blood and/or blood products.   Further, I understand that despite careful testing and screening of blood or blood products by collecting agencies, I may still be subject to ill effects as a result of receiving a blood transfusion and/or blood products. The following are some, but not all, of the potential risks that can occur: fever and allergic reactions, hemolytic reactions, transmission of diseases such as Hepatitis, AIDS and Cytomegalovirus (CMV) and fluid overload. In the event that I wish to have an autologous transfusion of my own blood, or a directed donor transfusion. I will discuss this with my physician. 5.   I authorize the use of any specimen, organs, tissues, body parts or foreign objects that may be removed from my body during the operation/procedure for diagnosis, research or teaching purposes and their subsequent disposal by hospital authorities. I also authorize the release of specimen test results and/or written reports to my treating physician on the hospital medical staff or other referring or consulting physicians involved in my care, at the discretion of the Pathologist or my treating physician. 6.   I consent to the photographing or videotaping of the operations or procedures to be performed, including appropriate portions of my body for medical, scientific, or educational purposes, provided my identity is not revealed by the pictures or by descriptive texts accompanying them. If the procedure has been photographed/videotaped, the surgeon will obtain the original picture, image, videotape or CD. The hospital will not be responsible for storage, release or maintenance of the picture, image, tape or CD.    7.   I consent to the presence of a  or observers in the operating room as deemed necessary by my physician or their designees. 8.   I recognize that in the event my procedure results in extended X-Ray/fluoroscopy time, I may develop a skin reaction. 9.  If I have a Do Not Attempt Resuscitation (DNAR) order in place, that status will be suspended while in the operating room, procedural suite, and during the recovery period unless otherwise explicitly stated by me (or a person authorized to consent on my behalf). The surgeon or my attending physician will determine when the applicable recovery period ends for purposes of reinstating the DNAR order. 10. Patients having a sterilization procedure: I understand that if the procedure is successful the results will be permanent and it will therefore be impossible for me to inseminate, conceive, or bear children. I also understand that the procedure is intended to result in sterility, although the result has not been guaranteed. 11. I acknowledge that my physician has explained sedation/analgesia administration to me including the risk and benefits I consent to the administration of sedation/analgesia as may be necessary or desirable in the judgment of my physician.     I CERTIFY THAT I HAVE READ AND FULLY UNDERSTAND THE ABOVE CONSENT TO OPERATION and/or OTHER PROCEDURE.      _________________________________________  __________________________________  Signature of Patient     Signature of Responsible Person         ___________________________________         Printed Name of Responsible Person           _________________________________                 Relationship to Patient  _________________________________________  ______________________________  Signature of Witness          Date  Time    Patient Name: Michelle Womack     : 1940                 Printed: 2023     Medical Record #: [id number]                     Page 1 of 1

## (undated) NOTE — LETTER
[address] [facility] [place] FOR BLOOD [place] BLOOD COMPONENTS      In the course of your treatment, it may become necessary to administer a transfusion of blood or blood components. This form provides basic information concerning this procedure and, if signed by you, authorizes its performance by qualified medical personnel. DESCRIPTION OF PROCEDURE:  Blood is introduced into one of your veins, commonly in the arm, using a sterilized disposable needle. The amount of blood transfused, and whether the transfusion will be of blood or blood components is a judgment the physician will make based on your particular needs. RISKS:  The transfusion is a common procedure of low risk. MINOR AND TEMPORARY REACTIONS ARE NOT UNCOMMON, including a slight bruise, swelling or local reaction in the area where the needle pierces your skin, or a non-serious reaction to the transfused material itself, including headache, fever or a mild skin reaction, such as rash. Serious reactions are possible, though very unlikely and include severe allergic reaction (shock)  and destruction (hemolysis) of transfused blood cells. Infectious diseases which are known to be transmitted by blood transfusion include CERTAIN TYPES OF VIRAL HEPATITIS, a viral infection of the liver, HUMAN IMMUNODEFICIENCY VIRUS (HIV-1,2) infection, a viral infection known to cause ACQUIRED IMMUNODEFICIENCY SYNDROME (AIDS) AS WELL AS CERTAIN OTHER BACTERIAL, VIRAL AND PARASITIC DISEASES. While a minimal risk of acquiring an infectious disease from transfused blood exists, in accordance with Federal and State law all due care has been taken in donor selection and testing to avoid transmission of disease. ALTERNATIVES:  If loss of blood poses serious threats in the course of your treatment, THERE IS NO EFFECTIVE ALTERNATIVE TO BLOOD TRANSFUSION.  However, if you have any further questions on this matter, your physician will fully explain the alternatives to you if it has not already been done. I,Alex Santiago, have read/had read to me the above. I understand the matters bearing on the decision whether or not to authorize a transfusion of blood or blood components. I have no questions which have not been answered to my full satisfaction.  I hereby consent to such transfusion as  my physician may deem necessary or advisable in the course of my treatment.        _______________   __________________________________________________  Date     Signature of Patient, Parent or Legal Guardian      ([place] [facility])      __________________________________________  Witness to Signature (title or relationship to patient)    Patient Name: Arnold lAan     : 1940                 Printed: 2023     Medical Record #: [id number]                    Page 1 of 1

## (undated) NOTE — LETTER
[facility]  Eduardostephania Riversmarcelle 60 [id number] [facility], [address] [place] [address]    Consent for Operation    Date: __________________    Time: _______________    1.  I authorize the performance upon Alicja Ge the following operation:                             [employer] videotape. [facility] will not be responsible for storage or maintenance of this tape. 6. For the purpose of advancing medical education, I consent to the admittance of observers to the Operating Room.     7. I authorize the use of any specimen, organs Signature of Patient:   ___________________________    When the patient is a minor or mentally incompetent to give consent:  Signature of person authorized to consent for patient: ___________________________   Relationship to patient: _____________________ · If I am allergic to anything or have had a reaction to anesthesia before. 3. I understand how the anesthesia medicine will help me (benefits). 4. I understand that with any type of anesthesia medicine there are risks:  a.  The most common risks are: their representative has agreed to have anesthesia services.     _____________________________________________________________________________  Witness        Date   Time  I have verified that the signature is that of the patient or patient’s representative